# Patient Record
Sex: MALE | Race: WHITE | NOT HISPANIC OR LATINO
[De-identification: names, ages, dates, MRNs, and addresses within clinical notes are randomized per-mention and may not be internally consistent; named-entity substitution may affect disease eponyms.]

---

## 2018-02-20 ENCOUNTER — APPOINTMENT (OUTPATIENT)
Dept: OTOLARYNGOLOGY | Facility: CLINIC | Age: 73
End: 2018-02-20
Payer: COMMERCIAL

## 2018-02-20 VITALS — HEIGHT: 73 IN | SYSTOLIC BLOOD PRESSURE: 107 MMHG | DIASTOLIC BLOOD PRESSURE: 70 MMHG

## 2018-02-20 PROCEDURE — 99213 OFFICE O/P EST LOW 20 MIN: CPT | Mod: 25

## 2018-02-20 PROCEDURE — 31579 LARYNGOSCOPY TELESCOPIC: CPT

## 2018-05-22 ENCOUNTER — APPOINTMENT (OUTPATIENT)
Dept: OTOLARYNGOLOGY | Facility: CLINIC | Age: 73
End: 2018-05-22
Payer: COMMERCIAL

## 2018-05-22 VITALS — HEIGHT: 73 IN | WEIGHT: 220 LBS | BODY MASS INDEX: 29.16 KG/M2

## 2018-05-22 VITALS — DIASTOLIC BLOOD PRESSURE: 86 MMHG | SYSTOLIC BLOOD PRESSURE: 152 MMHG

## 2018-05-22 PROCEDURE — 31579 LARYNGOSCOPY TELESCOPIC: CPT

## 2018-05-22 PROCEDURE — 99214 OFFICE O/P EST MOD 30 MIN: CPT | Mod: 25

## 2018-10-29 ENCOUNTER — APPOINTMENT (OUTPATIENT)
Dept: OTOLARYNGOLOGY | Facility: CLINIC | Age: 73
End: 2018-10-29
Payer: MEDICARE

## 2018-10-29 VITALS
HEIGHT: 73 IN | WEIGHT: 230 LBS | DIASTOLIC BLOOD PRESSURE: 76 MMHG | BODY MASS INDEX: 30.48 KG/M2 | SYSTOLIC BLOOD PRESSURE: 129 MMHG

## 2018-10-29 PROCEDURE — 99214 OFFICE O/P EST MOD 30 MIN: CPT | Mod: 25

## 2018-10-29 PROCEDURE — 31576 LARYNGOSCOPY WITH BIOPSY: CPT

## 2019-04-29 ENCOUNTER — APPOINTMENT (OUTPATIENT)
Dept: OTOLARYNGOLOGY | Facility: CLINIC | Age: 74
End: 2019-04-29
Payer: MEDICARE

## 2019-04-29 VITALS
HEIGHT: 73 IN | BODY MASS INDEX: 33.13 KG/M2 | WEIGHT: 250 LBS | SYSTOLIC BLOOD PRESSURE: 145 MMHG | DIASTOLIC BLOOD PRESSURE: 79 MMHG

## 2019-04-29 PROCEDURE — 99213 OFFICE O/P EST LOW 20 MIN: CPT | Mod: 25

## 2019-04-29 PROCEDURE — 31575 DIAGNOSTIC LARYNGOSCOPY: CPT

## 2019-04-29 NOTE — PROCEDURE
[Image(s) Captured] : image(s) captured and filed [Hoarseness] : hoarseness not clearly evaluated by indirect laryngoscopy [Topical Lidocaine] : topical lidocaine [Flexible Endoscope] : examined with the flexible endoscope [Normal] : normal [de-identified] : Storz laryngoscope:    both vocal cords move well. no definite lesion....erythema from radiation

## 2019-04-29 NOTE — HISTORY OF PRESENT ILLNESS
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 Meza. Sent for RT with Dr. Hernandez - completed 2015.  + slight hoarse remains stable.  + still smoking 1PPD

## 2019-08-02 ENCOUNTER — APPOINTMENT (OUTPATIENT)
Dept: INTERNAL MEDICINE | Facility: CLINIC | Age: 74
End: 2019-08-02
Payer: MEDICARE

## 2019-08-02 VITALS
SYSTOLIC BLOOD PRESSURE: 120 MMHG | BODY MASS INDEX: 29.55 KG/M2 | DIASTOLIC BLOOD PRESSURE: 70 MMHG | HEIGHT: 73 IN | TEMPERATURE: 97.6 F | OXYGEN SATURATION: 96 % | WEIGHT: 223 LBS | HEART RATE: 76 BPM

## 2019-08-02 DIAGNOSIS — R35.0 FREQUENCY OF MICTURITION: ICD-10-CM

## 2019-08-02 LAB
BILIRUB UR QL STRIP: NEGATIVE
CLARITY UR: CLEAR
COLLECTION METHOD: NORMAL
GLUCOSE UR-MCNC: NEGATIVE
HCG UR QL: 0.2 EU/DL
HGB UR QL STRIP.AUTO: NEGATIVE
KETONES UR-MCNC: NEGATIVE
LEUKOCYTE ESTERASE UR QL STRIP: NEGATIVE
NITRITE UR QL STRIP: NEGATIVE
PH UR STRIP: 6.5
PROT UR STRIP-MCNC: NEGATIVE
SP GR UR STRIP: 1.02

## 2019-08-02 PROCEDURE — G0444 DEPRESSION SCREEN ANNUAL: CPT | Mod: 59

## 2019-08-02 PROCEDURE — 99204 OFFICE O/P NEW MOD 45 MIN: CPT | Mod: 25

## 2019-08-02 PROCEDURE — 81003 URINALYSIS AUTO W/O SCOPE: CPT | Mod: QW

## 2019-08-02 PROCEDURE — G0442 ANNUAL ALCOHOL SCREEN 15 MIN: CPT | Mod: 59

## 2019-08-02 NOTE — PHYSICAL EXAM
[No Acute Distress] : no acute distress [Well Nourished] : well nourished [Well Developed] : well developed [Well-Appearing] : well-appearing [PERRL] : pupils equal round and reactive to light [Normal Sclera/Conjunctiva] : normal sclera/conjunctiva [EOMI] : extraocular movements intact [Normal Outer Ear/Nose] : the outer ears and nose were normal in appearance [Normal Oropharynx] : the oropharynx was normal [No JVD] : no jugular venous distention [No Lymphadenopathy] : no lymphadenopathy [Supple] : supple [Thyroid Normal, No Nodules] : the thyroid was normal and there were no nodules present [No Accessory Muscle Use] : no accessory muscle use [No Respiratory Distress] : no respiratory distress  [Clear to Auscultation] : lungs were clear to auscultation bilaterally [Normal Rate] : normal rate  [Regular Rhythm] : with a regular rhythm [Normal S1, S2] : normal S1 and S2 [No Murmur] : no murmur heard [No Carotid Bruits] : no carotid bruits [No Abdominal Bruit] : a ~M bruit was not heard ~T in the abdomen [No Varicosities] : no varicosities [Pedal Pulses Present] : the pedal pulses are present [No Edema] : there was no peripheral edema [No Palpable Aorta] : no palpable aorta [No Extremity Clubbing/Cyanosis] : no extremity clubbing/cyanosis [Non Tender] : non-tender [Soft] : abdomen soft [Non-distended] : non-distended [No Masses] : no abdominal mass palpated [Normal Bowel Sounds] : normal bowel sounds [No HSM] : no HSM [Normal Posterior Cervical Nodes] : no posterior cervical lymphadenopathy [Normal Anterior Cervical Nodes] : no anterior cervical lymphadenopathy [No CVA Tenderness] : no CVA  tenderness [No Spinal Tenderness] : no spinal tenderness [No Joint Swelling] : no joint swelling [No Rash] : no rash [Grossly Normal Strength/Tone] : grossly normal strength/tone [Coordination Grossly Intact] : coordination grossly intact [No Focal Deficits] : no focal deficits [Normal Gait] : normal gait [Deep Tendon Reflexes (DTR)] : deep tendon reflexes were 2+ and symmetric [Normal Affect] : the affect was normal [Normal Insight/Judgement] : insight and judgment were intact

## 2019-08-04 ENCOUNTER — RX RENEWAL (OUTPATIENT)
Age: 74
End: 2019-08-04

## 2019-08-04 NOTE — HEALTH RISK ASSESSMENT
[] : Yes [26-29] : 26-78 [Yes] : Yes [2 - 4 times a month (2 pts)] : 2-4 times a month (2 points) [1 or 2 (0 pts)] : 1 or 2 (0 points) [Never (0 pts)] : Never (0 points) [No] : In the past 12 months have you used drugs other than those required for medical reasons? No [No falls in past year] : Patient reported no falls in the past year [0] : 2) Feeling down, depressed, or hopeless: Not at all (0) [Audit-CScore] : 3 [de-identified] : walks, ride bicycle

## 2019-08-04 NOTE — HISTORY OF PRESENT ILLNESS
[FreeTextEntry8] : Pt finished treatment for rectal abscess last week, and  Has c/o "burning" sensation in lower abdomen times 2.5 weeks.. denies heartburn, normal bowels\par  also urinary frequency; difficulty with stream, incomplete emptying, \par hx of bph \par \par

## 2019-08-05 ENCOUNTER — RX RENEWAL (OUTPATIENT)
Age: 74
End: 2019-08-05

## 2019-08-05 LAB
25(OH)D3 SERPL-MCNC: 27 NG/ML
ALBUMIN SERPL ELPH-MCNC: 4.4 G/DL
ALP BLD-CCNC: 58 U/L
ALT SERPL-CCNC: 24 U/L
AMYLASE/CREAT SERPL: 79 U/L
ANION GAP SERPL CALC-SCNC: 13 MMOL/L
AST SERPL-CCNC: 21 U/L
BACTERIA UR CULT: NORMAL
BASOPHILS # BLD AUTO: 0.02 K/UL
BASOPHILS NFR BLD AUTO: 0.2 %
BILIRUB SERPL-MCNC: 0.3 MG/DL
BUN SERPL-MCNC: 18 MG/DL
CALCIUM SERPL-MCNC: 9.1 MG/DL
CHLORIDE SERPL-SCNC: 107 MMOL/L
CHOLEST SERPL-MCNC: 171 MG/DL
CHOLEST/HDLC SERPL: 6.1 RATIO
CO2 SERPL-SCNC: 25 MMOL/L
CREAT SERPL-MCNC: 1.3 MG/DL
EOSINOPHIL # BLD AUTO: 0.38 K/UL
EOSINOPHIL NFR BLD AUTO: 3.5 %
GLUCOSE SERPL-MCNC: 134 MG/DL
HCT VFR BLD CALC: 49.4 %
HDLC SERPL-MCNC: 28 MG/DL
HGB BLD-MCNC: 15.9 G/DL
IMM GRANULOCYTES NFR BLD AUTO: 0.4 %
LDLC SERPL CALC-MCNC: 101 MG/DL
LPL SERPL-CCNC: 72 U/L
LYMPHOCYTES # BLD AUTO: 2.58 K/UL
LYMPHOCYTES NFR BLD AUTO: 23.8 %
MAN DIFF?: NORMAL
MCHC RBC-ENTMCNC: 32.2 GM/DL
MCHC RBC-ENTMCNC: 32.4 PG
MCV RBC AUTO: 100.8 FL
MONOCYTES # BLD AUTO: 0.79 K/UL
MONOCYTES NFR BLD AUTO: 7.3 %
NEUTROPHILS # BLD AUTO: 7.01 K/UL
NEUTROPHILS NFR BLD AUTO: 64.8 %
PLATELET # BLD AUTO: 203 K/UL
POTASSIUM SERPL-SCNC: 4.9 MMOL/L
PROT SERPL-MCNC: 7.1 G/DL
PSA SERPL-MCNC: 0.83 NG/ML
RBC # BLD: 4.9 M/UL
RBC # FLD: 13.7 %
SODIUM SERPL-SCNC: 145 MMOL/L
TRIGL SERPL-MCNC: 208 MG/DL
TSH SERPL-ACNC: 0.69 UIU/ML
VIT B12 SERPL-MCNC: 495 PG/ML
WBC # FLD AUTO: 10.82 K/UL

## 2019-08-06 ENCOUNTER — MOBILE ON CALL (OUTPATIENT)
Age: 74
End: 2019-08-06

## 2019-08-07 ENCOUNTER — RX RENEWAL (OUTPATIENT)
Age: 74
End: 2019-08-07

## 2019-12-05 ENCOUNTER — RX RENEWAL (OUTPATIENT)
Age: 74
End: 2019-12-05

## 2019-12-05 DIAGNOSIS — J45.909 UNSPECIFIED ASTHMA, UNCOMPLICATED: ICD-10-CM

## 2020-05-13 VITALS — HEIGHT: 73 IN | WEIGHT: 250 LBS | BODY MASS INDEX: 33.13 KG/M2

## 2020-05-13 NOTE — ASSESSMENT
[Discussed Risks and Advised to Quit Smoking] : Discussed risks and advised to quit smoking [Declined] : Patient has declined cessation intervention

## 2020-05-13 NOTE — PLAN
[Smoking Cessation Guidance Provided] : Smoking cessation guidance was provided to patient [Age appropriate screenings] : Age appropriate screenings [Regular Exercise] : regular exercise [Smoking Cessation] : smoking cessation [FreeTextEntry1] : His LDCT is scheduled for 5/14/20 at the [facility].  [Regular follow-up with healthcare provider] : regular follow-up with healthcare provider

## 2020-05-13 NOTE — REASON FOR VISIT
[Initial Evaluation] : an initial evaluation visit [Virtual Visit] : virtual visit [Low-Dose CT Screening Discussion] : low-dose CT lung cancer screening discussion [Review of Eligibility] : review of eligibility

## 2020-05-13 NOTE — HISTORY OF PRESENT ILLNESS
[_____ pack-years] : [unfilled] pack-years [Current] : current smoker [TextBox_13] : He denies hemoptysis, denies new cough, denies unexplained weight loss.\par He is a current smoker with a 54 pack year smoking history (1PPD x 54 years).  He denies family h/o lung cancer.  He is referred by Dr. Kathrin Barnes.

## 2020-05-20 ENCOUNTER — APPOINTMENT (OUTPATIENT)
Dept: CT IMAGING | Facility: CLINIC | Age: 75
End: 2020-05-20
Payer: COMMERCIAL

## 2020-05-20 ENCOUNTER — OUTPATIENT (OUTPATIENT)
Dept: OUTPATIENT SERVICES | Facility: HOSPITAL | Age: 75
LOS: 1 days | End: 2020-05-20
Payer: COMMERCIAL

## 2020-05-20 ENCOUNTER — RESULT REVIEW (OUTPATIENT)
Age: 75
End: 2020-05-20

## 2020-05-20 DIAGNOSIS — J38.7 OTHER DISEASES OF LARYNX: Chronic | ICD-10-CM

## 2020-05-20 DIAGNOSIS — Z98.89 OTHER SPECIFIED POSTPROCEDURAL STATES: Chronic | ICD-10-CM

## 2020-05-20 DIAGNOSIS — Z00.8 ENCOUNTER FOR OTHER GENERAL EXAMINATION: ICD-10-CM

## 2020-05-20 DIAGNOSIS — E04.1 NONTOXIC SINGLE THYROID NODULE: Chronic | ICD-10-CM

## 2020-05-20 PROCEDURE — G0297: CPT

## 2020-05-20 PROCEDURE — G0297: CPT | Mod: 26

## 2020-10-14 DIAGNOSIS — M54.2 CERVICALGIA: ICD-10-CM

## 2020-11-24 ENCOUNTER — APPOINTMENT (OUTPATIENT)
Dept: FAMILY MEDICINE | Facility: CLINIC | Age: 75
End: 2020-11-24
Payer: MEDICARE

## 2020-11-24 VITALS
WEIGHT: 260 LBS | DIASTOLIC BLOOD PRESSURE: 60 MMHG | SYSTOLIC BLOOD PRESSURE: 106 MMHG | HEART RATE: 75 BPM | RESPIRATION RATE: 20 BRPM | OXYGEN SATURATION: 95 % | BODY MASS INDEX: 34.3 KG/M2

## 2020-11-24 DIAGNOSIS — E07.9 DISORDER OF THYROID, UNSPECIFIED: ICD-10-CM

## 2020-11-24 DIAGNOSIS — H61.21 IMPACTED CERUMEN, RIGHT EAR: ICD-10-CM

## 2020-11-24 DIAGNOSIS — H60.93 UNSPECIFIED OTITIS EXTERNA, BILATERAL: ICD-10-CM

## 2020-11-24 PROCEDURE — 99406 BEHAV CHNG SMOKING 3-10 MIN: CPT | Mod: 25

## 2020-11-24 PROCEDURE — 99214 OFFICE O/P EST MOD 30 MIN: CPT | Mod: 25

## 2020-11-24 PROCEDURE — 69210 REMOVE IMPACTED EAR WAX UNI: CPT

## 2020-11-24 NOTE — COUNSELING
[Risk of tobacco use and health benefits of smoking cessation discussed] : Risk of tobacco use and health benefits of smoking cessation discussed [Cessation strategies including cessation program discussed] : Cessation strategies including cessation program discussed [Use of nicotine replacement therapies and other medications discussed] : Use of nicotine replacement therapies and other medications discussed [Encouraged to pick a quit date and identify support needed to quit] : Encouraged to pick a quit date and identify support needed to quit [Willing to Quit Smoking] : Willing to quit smoking [Participate in Class] : Participate in class [Tobacco Use Cessation Intermediate Greater Than 3 Minutes Up to 10 Minutes] : Tobacco Use Cessation Intermediate Greater Than 3 Minutes Up to 10 Minutes [FreeTextEntry1] : 10

## 2020-11-24 NOTE — PHYSICAL EXAM
[Normal] : affect was normal and insight and judgment were intact [de-identified] : red pharynx without exudate. right ear quentin with wax. left ear canal swollen, visualized TM with prominent landmarks.  [de-identified] : left cervical lymph node slightly enlarged, non-tender

## 2020-11-24 NOTE — HISTORY OF PRESENT ILLNESS
[FreeTextEntry8] : Pt presents with decreased hearing, and feeling "clogged.feeling". Pt also has "a gland swollen". Patient has history of laryngeal cancer for which he received radiation. Has not had f/u with ENT in a year and a half.  smoking 1 ppd, greater than 50 pack years

## 2020-11-24 NOTE — REVIEW OF SYSTEMS
[Earache] : earache [Sore Throat] : sore throat [Hoarseness] : hoarseness [Anxiety] : anxiety [Swollen Glands] : swollen glands [Negative] : Psychiatric [Hearing Loss] : no hearing loss [Nosebleeds] : no nosebleeds [Postnasal Drip] : no postnasal drip [Nasal Discharge] : no nasal discharge [Suicidal] : not suicidal [Insomnia] : no insomnia [Depression] : no depression [FreeTextEntry4] : minor s/t, chronic [de-identified] : has caretaker stress b/c he is caring for his disabled wife, and raising his granddaughter

## 2020-12-08 ENCOUNTER — APPOINTMENT (OUTPATIENT)
Dept: OTOLARYNGOLOGY | Facility: CLINIC | Age: 75
End: 2020-12-08
Payer: MEDICARE

## 2020-12-08 ENCOUNTER — TRANSCRIPTION ENCOUNTER (OUTPATIENT)
Age: 75
End: 2020-12-08

## 2020-12-08 VITALS
SYSTOLIC BLOOD PRESSURE: 132 MMHG | BODY MASS INDEX: 34.46 KG/M2 | WEIGHT: 260 LBS | HEIGHT: 73 IN | DIASTOLIC BLOOD PRESSURE: 69 MMHG

## 2020-12-08 DIAGNOSIS — C32.9 MALIGNANT NEOPLASM OF LARYNX, UNSPECIFIED: ICD-10-CM

## 2020-12-08 DIAGNOSIS — R49.0 DYSPHONIA: ICD-10-CM

## 2020-12-08 PROCEDURE — 99214 OFFICE O/P EST MOD 30 MIN: CPT | Mod: 25

## 2020-12-08 PROCEDURE — 31576 LARYNGOSCOPY WITH BIOPSY: CPT

## 2020-12-08 RX ORDER — FINASTERIDE 5 MG/1
5 TABLET, FILM COATED ORAL DAILY
Qty: 90 | Refills: 3 | Status: COMPLETED | COMMUNITY
End: 2020-12-08

## 2020-12-08 RX ORDER — ZALEPLON 5 MG/1
5 CAPSULE ORAL
Qty: 30 | Refills: 3 | Status: COMPLETED | COMMUNITY
Start: 2020-04-06 | End: 2020-12-08

## 2020-12-08 RX ORDER — PANTOPRAZOLE 40 MG/1
40 TABLET, DELAYED RELEASE ORAL DAILY
Qty: 90 | Refills: 3 | Status: COMPLETED | COMMUNITY
Start: 2019-08-02 | End: 2020-12-08

## 2020-12-08 RX ORDER — DICLOFENAC SODIUM 50 MG/1
50 TABLET, DELAYED RELEASE ORAL 3 TIMES DAILY
Qty: 60 | Refills: 0 | Status: COMPLETED | COMMUNITY
Start: 2020-08-31 | End: 2020-12-08

## 2020-12-08 RX ORDER — TAMSULOSIN HYDROCHLORIDE 0.4 MG/1
0.4 CAPSULE ORAL
Qty: 90 | Refills: 0 | Status: COMPLETED | COMMUNITY
End: 2020-12-08

## 2020-12-08 RX ORDER — AZITHROMYCIN 250 MG/1
250 TABLET, FILM COATED ORAL
Qty: 6 | Refills: 0 | Status: COMPLETED | COMMUNITY
Start: 2019-12-05 | End: 2020-12-08

## 2020-12-08 RX ORDER — NEOMYCIN SULFATE, POLYMYXIN B SULFATE, HYDROCORTISONE 3.5; 10000; 1 MG/ML; [USP'U]/ML; MG/ML
1 SOLUTION/ DROPS AURICULAR (OTIC) 4 TIMES DAILY
Qty: 1 | Refills: 0 | Status: COMPLETED | COMMUNITY
Start: 2020-11-24 | End: 2020-12-08

## 2020-12-08 NOTE — REVIEW OF SYSTEMS
[As Noted in HPI] : as noted in HPI [Patient Intake Form Reviewed] : Patient intake form was reviewed [Negative] : Head and Neck

## 2020-12-08 NOTE — PROCEDURE
[Image(s) Captured] : image(s) captured and filed [Video Captured] : video captured and filed [Topical Lidocaine] : topical lidocaine [Oxymetazoline HCl] : oxymetazoline HCl [Rigid Endoscope] : examined with a rigid endoscope [Serial Number: ___] : Serial Number: [unfilled] [Hoarseness] : hoarseness not clearly evaluated by indirect laryngoscopy [Normal] : normal [True Vocal Cords Paralysis] : no true vocal cord paralysis [True Vocal Cords Erythematous] : no true vocal cord edema [Glottis Arytenoid Cartilages] : no arytenoid granulomas [Glottis Arytenoid Cartilages Erythema] : no arytenoid erythema [de-identified] : Flexible right nasolaryngoscopy:  Both vocal cords move well.  No lesions of larynx.

## 2020-12-08 NOTE — HISTORY OF PRESENT ILLNESS
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 Meza. Sent for RT with Dr. Hernandez - completed 2015. Voice remains stable. + still smoking 1PPD \par \par Reports bilateral clogged ears. States was told by PCP ear canal is narrow 3 weeks ago and to follow up with ENT. Denies otalgia, otorrhea, ear infections, hearing loss, tinnitus, dizziness, vertigo, headaches related to hearing

## 2020-12-15 DIAGNOSIS — Z20.828 CONTACT WITH AND (SUSPECTED) EXPOSURE TO OTHER VIRAL COMMUNICABLE DISEASES: ICD-10-CM

## 2020-12-18 LAB — SARS-COV-2 N GENE NPH QL NAA+PROBE: NOT DETECTED

## 2021-01-12 ENCOUNTER — NON-APPOINTMENT (OUTPATIENT)
Age: 76
End: 2021-01-12

## 2021-01-25 DIAGNOSIS — Z20.822 CONTACT WITH AND (SUSPECTED) EXPOSURE TO COVID-19: ICD-10-CM

## 2021-03-24 DIAGNOSIS — M26.609 UNSPECIFIED TEMPOROMANDIBULAR JOINT DISORDER: ICD-10-CM

## 2021-04-13 ENCOUNTER — APPOINTMENT (OUTPATIENT)
Dept: FAMILY MEDICINE | Facility: CLINIC | Age: 76
End: 2021-04-13
Payer: MEDICARE

## 2021-04-13 ENCOUNTER — RESULT REVIEW (OUTPATIENT)
Age: 76
End: 2021-04-13

## 2021-04-13 VITALS
HEART RATE: 84 BPM | OXYGEN SATURATION: 97 % | DIASTOLIC BLOOD PRESSURE: 78 MMHG | RESPIRATION RATE: 20 BRPM | SYSTOLIC BLOOD PRESSURE: 128 MMHG

## 2021-04-13 DIAGNOSIS — Z00.00 ENCOUNTER FOR GENERAL ADULT MEDICAL EXAMINATION W/OUT ABNORMAL FINDINGS: ICD-10-CM

## 2021-04-13 PROCEDURE — 99214 OFFICE O/P EST MOD 30 MIN: CPT | Mod: 25

## 2021-04-13 PROCEDURE — 36415 COLL VENOUS BLD VENIPUNCTURE: CPT

## 2021-04-13 NOTE — HISTORY OF PRESENT ILLNESS
[FreeTextEntry8] : Patient with history of diverticulitis, started with left sided pain on 3/23/2021, on a trip to [place]. Pain was mild until the last two nights. no nausea, vomiting, normal BMs, no fever. \par describes a "burning", and most apparent over left rib cage.

## 2021-04-13 NOTE — PLAN
[FreeTextEntry1] : \par Will order blood work and CT scan to r/o diverticulitis - f/u with results\par Patient will be started on abx and will f/u \par Patient verbalized understanding

## 2021-04-13 NOTE — PHYSICAL EXAM
[Soft] : abdomen soft [Non-distended] : non-distended [No Masses] : no abdominal mass palpated [No HSM] : no HSM [Normal Bowel Sounds] : normal bowel sounds [Normal] : normal rate, regular rhythm, normal S1 and S2 and no murmur heard [de-identified] : tender to LUQ

## 2021-04-14 ENCOUNTER — APPOINTMENT (OUTPATIENT)
Dept: CT IMAGING | Facility: CLINIC | Age: 76
End: 2021-04-14
Payer: MEDICARE

## 2021-04-14 ENCOUNTER — OUTPATIENT (OUTPATIENT)
Dept: OUTPATIENT SERVICES | Facility: HOSPITAL | Age: 76
LOS: 1 days | End: 2021-04-14
Payer: MEDICARE

## 2021-04-14 DIAGNOSIS — Z98.89 OTHER SPECIFIED POSTPROCEDURAL STATES: Chronic | ICD-10-CM

## 2021-04-14 DIAGNOSIS — R10.9 UNSPECIFIED ABDOMINAL PAIN: ICD-10-CM

## 2021-04-14 DIAGNOSIS — J38.7 OTHER DISEASES OF LARYNX: Chronic | ICD-10-CM

## 2021-04-14 DIAGNOSIS — E04.1 NONTOXIC SINGLE THYROID NODULE: Chronic | ICD-10-CM

## 2021-04-14 LAB
25(OH)D3 SERPL-MCNC: 21.6 NG/ML
ALBUMIN SERPL ELPH-MCNC: 4.4 G/DL
ALP BLD-CCNC: 64 U/L
ALT SERPL-CCNC: 10 U/L
AMYLASE/CREAT SERPL: 91 U/L
ANION GAP SERPL CALC-SCNC: 10 MMOL/L
AST SERPL-CCNC: 18 U/L
BASOPHILS # BLD AUTO: 0.1 K/UL
BASOPHILS NFR BLD AUTO: 1.1 %
BILIRUB SERPL-MCNC: 0.2 MG/DL
BUN SERPL-MCNC: 20 MG/DL
CALCIUM SERPL-MCNC: 9.1 MG/DL
CHLORIDE SERPL-SCNC: 106 MMOL/L
CHOLEST SERPL-MCNC: 294 MG/DL
CO2 SERPL-SCNC: 23 MMOL/L
CREAT SERPL-MCNC: 1.36 MG/DL
EOSINOPHIL # BLD AUTO: 0.37 K/UL
EOSINOPHIL NFR BLD AUTO: 3.9 %
ESTIMATED AVERAGE GLUCOSE: 137 MG/DL
GLUCOSE SERPL-MCNC: 80 MG/DL
HBA1C MFR BLD HPLC: 6.4 %
HCT VFR BLD CALC: 49.3 %
HDLC SERPL-MCNC: 28 MG/DL
HGB BLD-MCNC: 16.1 G/DL
IMM GRANULOCYTES NFR BLD AUTO: 0.2 %
LDLC SERPL CALC-MCNC: 214 MG/DL
LPL SERPL-CCNC: 83 U/L
LYMPHOCYTES # BLD AUTO: 3.03 K/UL
LYMPHOCYTES NFR BLD AUTO: 31.9 %
MAN DIFF?: NORMAL
MCHC RBC-ENTMCNC: 32.7 GM/DL
MCHC RBC-ENTMCNC: 32.8 PG
MCV RBC AUTO: 100.4 FL
MONOCYTES # BLD AUTO: 0.77 K/UL
MONOCYTES NFR BLD AUTO: 8.1 %
NEUTROPHILS # BLD AUTO: 5.21 K/UL
NEUTROPHILS NFR BLD AUTO: 54.8 %
NONHDLC SERPL-MCNC: 266 MG/DL
PLATELET # BLD AUTO: 230 K/UL
POTASSIUM SERPL-SCNC: 4.2 MMOL/L
PROT SERPL-MCNC: 6.9 G/DL
RBC # BLD: 4.91 M/UL
RBC # FLD: 14.2 %
SODIUM SERPL-SCNC: 140 MMOL/L
TRIGL SERPL-MCNC: 259 MG/DL
TSH SERPL-ACNC: 0.82 UIU/ML
VIT B12 SERPL-MCNC: 371 PG/ML
WBC # FLD AUTO: 9.5 K/UL

## 2021-04-14 PROCEDURE — 74177 CT ABD & PELVIS W/CONTRAST: CPT

## 2021-04-14 PROCEDURE — G1004: CPT

## 2021-04-14 PROCEDURE — 74177 CT ABD & PELVIS W/CONTRAST: CPT | Mod: 26,ME

## 2021-05-06 ENCOUNTER — APPOINTMENT (OUTPATIENT)
Dept: GASTROENTEROLOGY | Facility: CLINIC | Age: 76
End: 2021-05-06
Payer: MEDICARE

## 2021-05-06 VITALS
HEART RATE: 76 BPM | DIASTOLIC BLOOD PRESSURE: 76 MMHG | WEIGHT: 208.25 LBS | BODY MASS INDEX: 27.6 KG/M2 | SYSTOLIC BLOOD PRESSURE: 126 MMHG | HEIGHT: 73 IN | OXYGEN SATURATION: 97 % | TEMPERATURE: 97.5 F

## 2021-05-06 DIAGNOSIS — R10.9 UNSPECIFIED ABDOMINAL PAIN: ICD-10-CM

## 2021-05-06 DIAGNOSIS — Z87.19 PERSONAL HISTORY OF OTHER DISEASES OF THE DIGESTIVE SYSTEM: ICD-10-CM

## 2021-05-06 PROCEDURE — 99202 OFFICE O/P NEW SF 15 MIN: CPT

## 2021-05-06 RX ORDER — L-METHYLFOLATE-ALGAE-VIT B12-B6 CAP 3-90.314-2-35 MG 3-90.314-2-35 MG
3-90.314-2-35 CAP ORAL
Qty: 180 | Refills: 0 | Status: DISCONTINUED | COMMUNITY
Start: 2021-01-07 | End: 2021-05-06

## 2021-05-06 RX ORDER — L-METHYLFOLATE-ALGAE-VIT B12-B6 CAP 3-90.314-2-35 MG 3-90.314-2-35 MG
3-90.314-2-35 CAP ORAL
Qty: 180 | Refills: 0 | Status: DISCONTINUED | COMMUNITY
Start: 2021-01-05 | End: 2021-05-06

## 2021-05-06 RX ORDER — CARISOPRODOL 250 MG/1
250 TABLET ORAL 4 TIMES DAILY
Qty: 30 | Refills: 3 | Status: DISCONTINUED | COMMUNITY
Start: 2021-03-24 | End: 2021-05-06

## 2021-05-06 RX ORDER — CIPROFLOXACIN HYDROCHLORIDE 500 MG/1
500 TABLET, FILM COATED ORAL TWICE DAILY
Qty: 20 | Refills: 0 | Status: DISCONTINUED | COMMUNITY
Start: 2021-04-13 | End: 2021-05-06

## 2021-05-06 NOTE — PHYSICAL EXAM
[General Appearance - Alert] : alert [General Appearance - In No Acute Distress] : in no acute distress [Neck Appearance] : the appearance of the neck was normal [Neck Cervical Mass (___cm)] : no neck mass was observed [Jugular Venous Distention Increased] : there was no jugular-venous distention [Thyroid Diffuse Enlargement] : the thyroid was not enlarged [Thyroid Nodule] : there were no palpable thyroid nodules [Auscultation Breath Sounds / Voice Sounds] : lungs were clear to auscultation bilaterally [Heart Rate And Rhythm] : heart rate was normal and rhythm regular [Heart Sounds] : normal S1 and S2 [Heart Sounds Gallop] : no gallops [Murmurs] : no murmurs [Heart Sounds Pericardial Friction Rub] : no pericardial rub [Full Pulse] : the pedal pulses are present [Edema] : there was no peripheral edema [Bowel Sounds] : normal bowel sounds [Abdomen Soft] : soft [Abdomen Tenderness] : non-tender [Abdomen Mass (___ Cm)] : no abdominal mass palpated [] : no hepato-splenomegaly [Cervical Lymph Nodes Enlarged Posterior Bilaterally] : posterior cervical [Cervical Lymph Nodes Enlarged Anterior Bilaterally] : anterior cervical [Supraclavicular Lymph Nodes Enlarged Bilaterally] : supraclavicular [Axillary Lymph Nodes Enlarged Bilaterally] : axillary [Femoral Lymph Nodes Enlarged Bilaterally] : femoral [Inguinal Lymph Nodes Enlarged Bilaterally] : inguinal [No CVA Tenderness] : no ~M costovertebral angle tenderness [No Spinal Tenderness] : no spinal tenderness [FreeTextEntry1] : There is tenderness/hypersensitivity of skin above left lower rib cage.  No tenderness to deep palpation.  No chest wall tenderness

## 2021-05-06 NOTE — ASSESSMENT
[FreeTextEntry1] : Symptoms consistent with superficial nerve inflammation no abdominal or chest wall tenderness.  Possible neuropathic/radicular pain.  Slightly elevated lipase is of no significance given normal amylase and normal CT.  No symptoms or signs consistent with GI pathology.\par \par Patient appears to be at average risk for colorectal cancer and would not need another colonoscopy until 10 years after his previous one.  No further GI work-up would be indicated at this time.  Suggest supportive treatment for nerve pain e.g. NSAIDs, neuromodulators, etc.

## 2021-05-06 NOTE — CONSULT LETTER
Problem: Pressure Injury, Risk for  Goal: # Skin remains intact  Outcome: Outcome Met, Continue evaluating goal progress toward completion  Barrier cream applied to bilateral heels and buttocks. Pt is being turned every two hours.     Problem: VTE, Risk for  Goal: # No s/s of VTE  Outcome: Outcome Met, Continue evaluating goal progress toward completion  No signs or symptoms of DVT upon assessment    Problem: At Risk for Falls  Goal: # Patient does not fall  Outcome: Outcome Met, Continue evaluating goal progress toward completion  No impulsive behaviors.        [Dear  ___] : Dear  [unfilled], [Consult Letter:] : I had the pleasure of evaluating your patient, [unfilled]. [Please see my note below.] : Please see my note below. [Sincerely,] : Sincerely, [FreeTextEntry2] : TRANG MAE [FreeTextEntry3] : Fernie Hebert MD

## 2021-05-09 ENCOUNTER — APPOINTMENT (OUTPATIENT)
Dept: FAMILY MEDICINE | Facility: CLINIC | Age: 76
End: 2021-05-09

## 2021-08-06 ENCOUNTER — APPOINTMENT (OUTPATIENT)
Dept: FAMILY MEDICINE | Facility: CLINIC | Age: 76
End: 2021-08-06

## 2021-09-24 DIAGNOSIS — M12.562 TRAUMATIC ARTHROPATHY, LEFT KNEE: ICD-10-CM

## 2021-12-05 ENCOUNTER — TRANSCRIPTION ENCOUNTER (OUTPATIENT)
Age: 76
End: 2021-12-05

## 2022-01-04 LAB
25(OH)D3 SERPL-MCNC: 24.6 NG/ML
ALBUMIN SERPL ELPH-MCNC: 4.4 G/DL
ALP BLD-CCNC: 65 U/L
ALT SERPL-CCNC: 18 U/L
AMYLASE/CREAT SERPL: 80 U/L
ANION GAP SERPL CALC-SCNC: 13 MMOL/L
AST SERPL-CCNC: 24 U/L
BASOPHILS # BLD AUTO: 0.1 K/UL
BASOPHILS NFR BLD AUTO: 1 %
BILIRUB SERPL-MCNC: 0.5 MG/DL
BUN SERPL-MCNC: 22 MG/DL
CALCIUM SERPL-MCNC: 9.3 MG/DL
CHLORIDE SERPL-SCNC: 107 MMOL/L
CHOLEST SERPL-MCNC: 307 MG/DL
CO2 SERPL-SCNC: 23 MMOL/L
CREAT SERPL-MCNC: 1.55 MG/DL
EOSINOPHIL # BLD AUTO: 0.42 K/UL
EOSINOPHIL NFR BLD AUTO: 4.3 %
ESTIMATED AVERAGE GLUCOSE: 137 MG/DL
FOLATE SERPL-MCNC: 12 NG/ML
GLUCOSE SERPL-MCNC: 105 MG/DL
HBA1C MFR BLD HPLC: 6.4 %
HCT VFR BLD CALC: 50 %
HDLC SERPL-MCNC: 31 MG/DL
HGB BLD-MCNC: 16.5 G/DL
IMM GRANULOCYTES NFR BLD AUTO: 0.5 %
LDLC SERPL CALC-MCNC: 222 MG/DL
LPL SERPL-CCNC: 75 U/L
LYMPHOCYTES # BLD AUTO: 2.74 K/UL
LYMPHOCYTES NFR BLD AUTO: 28.3 %
MAN DIFF?: NORMAL
MCHC RBC-ENTMCNC: 32.7 PG
MCHC RBC-ENTMCNC: 33 GM/DL
MCV RBC AUTO: 99 FL
MONOCYTES # BLD AUTO: 0.82 K/UL
MONOCYTES NFR BLD AUTO: 8.5 %
NEUTROPHILS # BLD AUTO: 5.55 K/UL
NEUTROPHILS NFR BLD AUTO: 57.4 %
NONHDLC SERPL-MCNC: 276 MG/DL
PLATELET # BLD AUTO: 215 K/UL
POTASSIUM SERPL-SCNC: 4.7 MMOL/L
PROT SERPL-MCNC: 7.1 G/DL
PSA SERPL-MCNC: 3.44 NG/ML
RBC # BLD: 5.05 M/UL
RBC # FLD: 14.1 %
SODIUM SERPL-SCNC: 143 MMOL/L
TRIGL SERPL-MCNC: 272 MG/DL
TSH SERPL-ACNC: 1.05 UIU/ML
VIT B12 SERPL-MCNC: 351 PG/ML
WBC # FLD AUTO: 9.68 K/UL

## 2022-02-15 ENCOUNTER — TRANSCRIPTION ENCOUNTER (OUTPATIENT)
Age: 77
End: 2022-02-15

## 2022-04-26 ENCOUNTER — APPOINTMENT (OUTPATIENT)
Dept: ORTHOPEDIC SURGERY | Facility: CLINIC | Age: 77
End: 2022-04-26
Payer: MEDICARE

## 2022-04-26 VITALS — WEIGHT: 250 LBS | HEIGHT: 73 IN | BODY MASS INDEX: 33.13 KG/M2

## 2022-04-26 DIAGNOSIS — S83.92XA SPRAIN OF UNSPECIFIED SITE OF LEFT KNEE, INITIAL ENCOUNTER: ICD-10-CM

## 2022-04-26 DIAGNOSIS — S83.8X2D SPRAIN OF OTHER SPECIFIED PARTS OF LEFT KNEE, SUBSEQUENT ENCOUNTER: ICD-10-CM

## 2022-04-26 PROCEDURE — 99213 OFFICE O/P EST LOW 20 MIN: CPT

## 2022-04-26 NOTE — ASSESSMENT
[FreeTextEntry1] : left knee pain with acute onset pain after fall off scooter on 9/23/21. mri 2021 show distal PCL avulsion, mmt, oa. fairly large mmt. improved with PT.  mild pain.

## 2022-04-26 NOTE — PHYSICAL EXAM
[Left] : left knee [] : anterior tenderness [NL (0)] : extension 0 degrees [4___] : quadriceps 4[unfilled]/5 [5___] : hamstring 5[unfilled]/5 [TWNoteComboBox7] : flexion 120 degrees

## 2022-04-26 NOTE — HISTORY OF PRESENT ILLNESS
[2] : 2 [Constant] : constant [de-identified] : 4/26/22: left knee pain improved but still present. [FreeTextEntry1] : left knee [de-identified] : physical therapy

## 2022-09-23 DIAGNOSIS — Z23 ENCOUNTER FOR IMMUNIZATION: ICD-10-CM

## 2022-10-18 ENCOUNTER — APPOINTMENT (OUTPATIENT)
Dept: ORTHOPEDIC SURGERY | Facility: CLINIC | Age: 77
End: 2022-10-18

## 2022-10-18 VITALS — WEIGHT: 250 LBS | BODY MASS INDEX: 33.13 KG/M2 | HEIGHT: 73 IN

## 2022-10-18 DIAGNOSIS — M54.50 LOW BACK PAIN, UNSPECIFIED: ICD-10-CM

## 2022-10-18 DIAGNOSIS — M79.18 MYALGIA, OTHER SITE: ICD-10-CM

## 2022-10-18 PROCEDURE — 99213 OFFICE O/P EST LOW 20 MIN: CPT

## 2022-10-18 PROCEDURE — 72100 X-RAY EXAM L-S SPINE 2/3 VWS: CPT

## 2022-10-18 PROCEDURE — 73502 X-RAY EXAM HIP UNI 2-3 VIEWS: CPT

## 2022-10-18 NOTE — DISCUSSION/SUMMARY
[de-identified] : Instructions:  Progress Note completed by Denise Orellana PA-C\par * Dr. Wu -- The documentation recorded accurately reflects the decisions made by me during this visit.

## 2022-10-18 NOTE — PHYSICAL EXAM
[4___] : abduction 4[unfilled]/5 [5___] : adduction 5[unfilled]/5 [2+] : posterior tibialis pulse: 2+ [Disc space narrowing] : Disc space narrowing [FreeTextEntry8] : ttp buttock [de-identified] : no fx.  oa present bilateral hips. [] : no scars [Bilateral] : hip bilaterally [Moderate arthritis (Tonnis Grade 2)] : Moderate arthritis (Tonnis Grade 2)

## 2022-10-18 NOTE — HISTORY OF PRESENT ILLNESS
[9] : 9 [1] : 2 [Dull/Aching] : dull/aching [Radiating] : radiating [Sharp] : sharp [Intermittent] : intermittent [Leisure] : leisure [Rest] : rest [de-identified] : 10/18/22:   bilateral buttock pain for a few months.  no injury.  pain worse with walking/standing for long periods of time.  \par  [] : Post Surgical Visit: no [FreeTextEntry1] : bilateral hips [FreeTextEntry3] : Sep 2022 [FreeTextEntry5] : No known injury [FreeTextEntry7] : buttocks [de-identified] : activity

## 2022-10-18 NOTE — ASSESSMENT
[FreeTextEntry1] : bilateral buttock pain for a few months.  no injury.  history of lumbar hnp - sees pain management for injections (recently got L4,L5 darwin early october 2022).  possible muscular in origin, possible lumbar etiology (L5,S1).  \par \par seeing dr. latham.  tpis did not help.

## 2023-02-23 ENCOUNTER — TRANSCRIPTION ENCOUNTER (OUTPATIENT)
Age: 78
End: 2023-02-23

## 2023-02-26 ENCOUNTER — TRANSCRIPTION ENCOUNTER (OUTPATIENT)
Age: 78
End: 2023-02-26

## 2023-03-03 ENCOUNTER — APPOINTMENT (OUTPATIENT)
Dept: ORTHOPEDIC SURGERY | Facility: CLINIC | Age: 78
End: 2023-03-03
Payer: MEDICARE

## 2023-03-03 ENCOUNTER — TRANSCRIPTION ENCOUNTER (OUTPATIENT)
Age: 78
End: 2023-03-03

## 2023-03-03 DIAGNOSIS — G62.9 POLYNEUROPATHY, UNSPECIFIED: ICD-10-CM

## 2023-03-03 DIAGNOSIS — M20.21 HALLUX RIGIDUS, RIGHT FOOT: ICD-10-CM

## 2023-03-03 DIAGNOSIS — R23.8 OTHER SKIN CHANGES: ICD-10-CM

## 2023-03-03 PROCEDURE — 73630 X-RAY EXAM OF FOOT: CPT | Mod: RT

## 2023-03-03 PROCEDURE — 99214 OFFICE O/P EST MOD 30 MIN: CPT

## 2023-03-03 NOTE — HISTORY OF PRESENT ILLNESS
[de-identified] : Patient is a 77 year old male presents with right great toe pain, swelling and discoloration since early February, 2023. Pain has been progressing. No treatment to date. No similar symptoms in the past. No trauma. He reports a history of neuropathy but no diabetes.

## 2023-03-03 NOTE — PHYSICAL EXAM
[NL (40)] : plantar flexion 40 degrees [NL 30)] : inversion 30 degrees [NL (20)] : eversion 20 degrees [5___] : [facility] 5[unfilled]/5 [1st] : 1st [absent] : posterior tibialis pulse: absent [Decreased] : saphenous nerve sensation decreased [Right] : right foot [Weight -] : weightbearing [] : no pain when stressing lateral tarsal metatarsal joint [FreeTextEntry3] : Clear demarkation dusky appearance of hallux from IP joint distally.  [FreeTextEntry8] : Palpable osteophytes dorsomedial great toe.  [de-identified] : Absent pulses on the LT as well.  [de-identified] : Severe 1st MTP joint degenerative changes with dorsal osteophyte formation, plantar heel spur, achilles insertion spur.  No bone changes in distal phalanx.

## 2023-03-03 NOTE — ASSESSMENT
[FreeTextEntry1] : Patient was explained that his problem in the great toe is likely vascular in nature.\par He was advised to go to the ER today for vascular evaluation as there is a fear that he could develop an infection or possible lose his toe.  He refuses to do this.\par \par He was made an appointment to see Dr. Bryant Bunn, a vascular specialist at 12:30 pm on 3/8/23.  This information was given to the patient.\par \par There is no orthopedic intervention warranted at this time.

## 2023-03-04 ENCOUNTER — INPATIENT (INPATIENT)
Facility: HOSPITAL | Age: 78
LOS: 5 days | Discharge: HOME | DRG: 254 | End: 2023-03-10
Attending: SURGERY | Admitting: INTERNAL MEDICINE
Payer: MEDICARE

## 2023-03-04 VITALS
DIASTOLIC BLOOD PRESSURE: 82 MMHG | TEMPERATURE: 98 F | HEIGHT: 73 IN | OXYGEN SATURATION: 97 % | HEART RATE: 85 BPM | SYSTOLIC BLOOD PRESSURE: 172 MMHG | WEIGHT: 250 LBS | RESPIRATION RATE: 18 BRPM

## 2023-03-04 DIAGNOSIS — Z98.89 OTHER SPECIFIED POSTPROCEDURAL STATES: Chronic | ICD-10-CM

## 2023-03-04 DIAGNOSIS — E04.1 NONTOXIC SINGLE THYROID NODULE: Chronic | ICD-10-CM

## 2023-03-04 DIAGNOSIS — I99.8 OTHER DISORDER OF CIRCULATORY SYSTEM: ICD-10-CM

## 2023-03-04 DIAGNOSIS — J38.7 OTHER DISEASES OF LARYNX: Chronic | ICD-10-CM

## 2023-03-04 LAB
ALBUMIN SERPL ELPH-MCNC: 4.1 G/DL — SIGNIFICANT CHANGE UP (ref 3.3–5)
ALP SERPL-CCNC: 61 U/L — SIGNIFICANT CHANGE UP (ref 40–120)
ALT FLD-CCNC: 20 U/L — SIGNIFICANT CHANGE UP (ref 10–45)
ANION GAP SERPL CALC-SCNC: 14 MMOL/L — SIGNIFICANT CHANGE UP (ref 5–17)
APTT BLD: 27.1 SEC — LOW (ref 27.5–35.5)
AST SERPL-CCNC: 22 U/L — SIGNIFICANT CHANGE UP (ref 10–40)
BASOPHILS # BLD AUTO: 0.11 K/UL — SIGNIFICANT CHANGE UP (ref 0–0.2)
BASOPHILS NFR BLD AUTO: 1.2 % — SIGNIFICANT CHANGE UP (ref 0–2)
BILIRUB SERPL-MCNC: 0.2 MG/DL — SIGNIFICANT CHANGE UP (ref 0.2–1.2)
BLD GP AB SCN SERPL QL: NEGATIVE — SIGNIFICANT CHANGE UP
BUN SERPL-MCNC: 26 MG/DL — HIGH (ref 7–23)
CALCIUM SERPL-MCNC: 9.3 MG/DL — SIGNIFICANT CHANGE UP (ref 8.4–10.5)
CHLORIDE SERPL-SCNC: 105 MMOL/L — SIGNIFICANT CHANGE UP (ref 96–108)
CO2 SERPL-SCNC: 21 MMOL/L — LOW (ref 22–31)
CREAT SERPL-MCNC: 1.32 MG/DL — HIGH (ref 0.5–1.3)
CRP SERPL-MCNC: 4 MG/L — SIGNIFICANT CHANGE UP (ref 0–4)
EGFR: 56 ML/MIN/1.73M2 — LOW
EOSINOPHIL # BLD AUTO: 0.45 K/UL — SIGNIFICANT CHANGE UP (ref 0–0.5)
EOSINOPHIL NFR BLD AUTO: 4.8 % — SIGNIFICANT CHANGE UP (ref 0–6)
ERYTHROCYTE [SEDIMENTATION RATE] IN BLOOD: 57 MM/HR — HIGH (ref 0–20)
GLUCOSE SERPL-MCNC: 141 MG/DL — HIGH (ref 70–99)
HCT VFR BLD CALC: 49.1 % — SIGNIFICANT CHANGE UP (ref 39–50)
HGB BLD-MCNC: 16 G/DL — SIGNIFICANT CHANGE UP (ref 13–17)
IMM GRANULOCYTES NFR BLD AUTO: 0.5 % — SIGNIFICANT CHANGE UP (ref 0–0.9)
INR BLD: 0.91 RATIO — SIGNIFICANT CHANGE UP (ref 0.88–1.16)
LYMPHOCYTES # BLD AUTO: 2.26 K/UL — SIGNIFICANT CHANGE UP (ref 1–3.3)
LYMPHOCYTES # BLD AUTO: 24.2 % — SIGNIFICANT CHANGE UP (ref 13–44)
MCHC RBC-ENTMCNC: 32.1 PG — SIGNIFICANT CHANGE UP (ref 27–34)
MCHC RBC-ENTMCNC: 32.6 GM/DL — SIGNIFICANT CHANGE UP (ref 32–36)
MCV RBC AUTO: 98.4 FL — SIGNIFICANT CHANGE UP (ref 80–100)
MONOCYTES # BLD AUTO: 0.85 K/UL — SIGNIFICANT CHANGE UP (ref 0–0.9)
MONOCYTES NFR BLD AUTO: 9.1 % — SIGNIFICANT CHANGE UP (ref 2–14)
NEUTROPHILS # BLD AUTO: 5.63 K/UL — SIGNIFICANT CHANGE UP (ref 1.8–7.4)
NEUTROPHILS NFR BLD AUTO: 60.2 % — SIGNIFICANT CHANGE UP (ref 43–77)
NRBC # BLD: 0 /100 WBCS — SIGNIFICANT CHANGE UP (ref 0–0)
PLATELET # BLD AUTO: 197 K/UL — SIGNIFICANT CHANGE UP (ref 150–400)
POTASSIUM SERPL-MCNC: 4.2 MMOL/L — SIGNIFICANT CHANGE UP (ref 3.5–5.3)
POTASSIUM SERPL-SCNC: 4.2 MMOL/L — SIGNIFICANT CHANGE UP (ref 3.5–5.3)
PROT SERPL-MCNC: 7.3 G/DL — SIGNIFICANT CHANGE UP (ref 6–8.3)
PROTHROM AB SERPL-ACNC: 10.6 SEC — SIGNIFICANT CHANGE UP (ref 10.5–13.4)
RBC # BLD: 4.99 M/UL — SIGNIFICANT CHANGE UP (ref 4.2–5.8)
RBC # FLD: 13.7 % — SIGNIFICANT CHANGE UP (ref 10.3–14.5)
RH IG SCN BLD-IMP: POSITIVE — SIGNIFICANT CHANGE UP
SARS-COV-2 RNA SPEC QL NAA+PROBE: SIGNIFICANT CHANGE UP
SODIUM SERPL-SCNC: 140 MMOL/L — SIGNIFICANT CHANGE UP (ref 135–145)
WBC # BLD: 9.35 K/UL — SIGNIFICANT CHANGE UP (ref 3.8–10.5)
WBC # FLD AUTO: 9.35 K/UL — SIGNIFICANT CHANGE UP (ref 3.8–10.5)

## 2023-03-04 PROCEDURE — 99285 EMERGENCY DEPT VISIT HI MDM: CPT

## 2023-03-04 PROCEDURE — 75635 CT ANGIO ABDOMINAL ARTERIES: CPT | Mod: 26

## 2023-03-04 PROCEDURE — 99223 1ST HOSP IP/OBS HIGH 75: CPT | Mod: GC

## 2023-03-04 PROCEDURE — 93925 LOWER EXTREMITY STUDY: CPT | Mod: 26

## 2023-03-04 PROCEDURE — 93923 UPR/LXTR ART STDY 3+ LVLS: CPT | Mod: 26

## 2023-03-04 RX ORDER — ACETAMINOPHEN 500 MG
650 TABLET ORAL EVERY 6 HOURS
Refills: 0 | Status: DISCONTINUED | OUTPATIENT
Start: 2023-03-04 | End: 2023-03-09

## 2023-03-04 RX ORDER — SODIUM CHLORIDE 9 MG/ML
1000 INJECTION INTRAMUSCULAR; INTRAVENOUS; SUBCUTANEOUS ONCE
Refills: 0 | Status: COMPLETED | OUTPATIENT
Start: 2023-03-04 | End: 2023-03-04

## 2023-03-04 RX ORDER — ENOXAPARIN SODIUM 100 MG/ML
40 INJECTION SUBCUTANEOUS EVERY 24 HOURS
Refills: 0 | Status: DISCONTINUED | OUTPATIENT
Start: 2023-03-04 | End: 2023-03-09

## 2023-03-04 RX ORDER — CYCLOBENZAPRINE HYDROCHLORIDE 10 MG/1
1 TABLET, FILM COATED ORAL
Qty: 0 | Refills: 0 | DISCHARGE

## 2023-03-04 RX ORDER — NICOTINE POLACRILEX 2 MG
1 GUM BUCCAL DAILY
Refills: 0 | Status: DISCONTINUED | OUTPATIENT
Start: 2023-03-04 | End: 2023-03-09

## 2023-03-04 RX ORDER — SODIUM CHLORIDE 9 MG/ML
1000 INJECTION INTRAMUSCULAR; INTRAVENOUS; SUBCUTANEOUS
Refills: 0 | Status: DISCONTINUED | OUTPATIENT
Start: 2023-03-04 | End: 2023-03-09

## 2023-03-04 RX ADMIN — SODIUM CHLORIDE 1000 MILLILITER(S): 9 INJECTION INTRAMUSCULAR; INTRAVENOUS; SUBCUTANEOUS at 15:12

## 2023-03-04 RX ADMIN — ENOXAPARIN SODIUM 40 MILLIGRAM(S): 100 INJECTION SUBCUTANEOUS at 18:16

## 2023-03-04 RX ADMIN — SODIUM CHLORIDE 50 MILLILITER(S): 9 INJECTION INTRAMUSCULAR; INTRAVENOUS; SUBCUTANEOUS at 17:52

## 2023-03-04 RX ADMIN — SODIUM CHLORIDE 50 MILLILITER(S): 9 INJECTION INTRAMUSCULAR; INTRAVENOUS; SUBCUTANEOUS at 19:57

## 2023-03-04 NOTE — H&P ADULT - ASSESSMENT
77 m with    Cyanotic toe PVD  - pain control  - CTA abdomen with runoff  - Vascular evaluation  - Podiatry evaluation  - Nephrology evaluation for optimization Dr Mcpherson  - gentle IVF    HTN  - control    Smoking  - Nicotine patch  - cessation advised    DVT prophylaxis    Further action as per clinical course     Howard Gillette MD phone [phone]

## 2023-03-04 NOTE — H&P ADULT - NSHPSOCIALHISTORY_GEN_ALL_CORE
Social History:    Marital Status:  ( x  )    (   ) Single    (   )    (  )   Occupation:   Lives with: (  ) alone  (  ) children   (x  ) spouse   (  ) parents  (  ) other    Substance Use (street drugs): (  x) never used  (  ) other:  Tobacco Usage:  (   ) never smoked   (   ) former smoker   ( x  ) current smoker  (     ) pack years  (        ) last cigarette date  Alcohol Usage: denies    (     ) Advanced Directives: (     ) None    (      ) DNR    (     ) DNI    (     ) Health Care Proxy:

## 2023-03-04 NOTE — ED ADULT NURSE REASSESSMENT NOTE - NS ED NURSE REASSESS COMMENT FT1
Report received from Jorje BILLINGSLEY in pink. Patient seen sleeping, easily arousable. Patient appears comfortable at this time. R toe noted to be discolored. Sensations intact, reports pain on top of R toe upon palpation. Awaiting dispo. Safety and comfort provided.

## 2023-03-04 NOTE — ED PROVIDER NOTE - PROGRESS NOTE DETAILS
Attending MD Slater. Pt signed out to me in stable condition pending CTA, TBA to medicine, podiatry, 76 yo male for PAD eval, R toe cyanotic x 1 mo with inc pain/claudication with ambulation. Jill Zavala MD PGY1: pt signed out to me pending vascular consult, duplex US. vascular at bedside, requesting CT chest/abd with runoff to legs to further evaluate. podiatry paged, CTA ordered, plan TBA medicine for further work up and management

## 2023-03-04 NOTE — H&P ADULT - NSICDXPASTMEDICALHX_GEN_ALL_CORE_FT
PAST MEDICAL HISTORY:  BPH (benign prostatic hypertrophy)     Laryngeal mass "pre-cancerous" May 2015-- to have repeat biopsy on 5-11-15    Lumbar disc disease     Snoring JONATHAN precautions -- responds affirmatively to STOP BANG questionnaire -- admits to loud snoring; age > 50; gender, male    Thyroid nodule findings on xray -- subsequent sonogram and biopsy (5 years ago)    Umbilical hernia

## 2023-03-04 NOTE — ED ADULT NURSE NOTE - NSICDXPASTMEDICALHX_GEN_ALL_CORE_FT
PAST MEDICAL HISTORY:  BPH (benign prostatic hypertrophy)     Laryngeal mass "pre-cancerous" May 2015-- to have repeat biopsy on 5-11-15    Lumbar disc disease     No pertinent past medical history     Snoring JONATHAN precautions -- responds affirmatively to STOP BANG questionnaire -- admits to loud snoring; age > 50; gender, male    Thyroid nodule findings on xray -- subsequent sonogram and biopsy (5 years ago)    Umbilical hernia

## 2023-03-04 NOTE — ED ADULT NURSE NOTE - NSICDXPASTSURGICALHX_GEN_ALL_CORE_FT
PAST SURGICAL HISTORY:  History of colonoscopy 2014    Laryngeal mass biopsy with "pre-cancerous lesion" -- to have repeat biopsy 5-11-15    S/P tonsillectomy Age 10    Thyroid nodule sonogram and biopsy (5 years ago)

## 2023-03-04 NOTE — ED PROVIDER NOTE - OBJECTIVE STATEMENT
77Y M, pmhx HTN, HLD, neuropathy, pt c/o right toe pain with discoloration for 1 month. Pt states he noted pain and slight discoloration of toe a month ago which has been worsening. has seen PCP, podiatrist, was told to see a vascular doctor,  pain and discoloration has increased, saw pcp yesterday and was told to go to ED. had negative x-rays at outpatient clinic as per patient. Pt presents to ED with 5/10 pain, 8/10 when toe is palpated  pt denies any fever, cp, palpitations, sob, abdominal pain, v/d, dysuria, numbness  falls or trauma or wounds/.

## 2023-03-04 NOTE — ED PROVIDER NOTE - PHYSICAL EXAMINATION
Vital signs reviewed  GENERAL: Patient nontoxic appearing, NAD  HEAD: NCAT  EYES: Anicteric  ENT: MMM  NECK: Supple, non tender  RESPIRATORY: Normal respiratory effort. CTA B/L. No wheezing, rales, rhonchi  CARDIOVASCULAR: Regular rate and rhythm  ABDOMEN: Soft. Nondistended. Nontender. No guarding or rebound. No CVA tenderness.  MUSCULOSKELETAL/EXTREMITIES: Brisk cap refill. 2+ radial pulses. No leg edema.  +R great toe purplish discoloration with moderate tenderness. cap refill 4s. 1+dp pulse, unable to palpate PT pulse.   no wounds noted.   SKIN:  Warm and dry  NEURO: AAOx3. No gross FND.  PSYCHIATRIC: Cooperative. Affect appropriate.

## 2023-03-04 NOTE — CONSULT NOTE ADULT - ASSESSMENT
77M presents with right foot hallux early ischemic changes.  - Pt seen and evaluated.  - Afebrile, WBC 9.35, ESR 57, CRP 0.4.  - Right foot hallux early ischemic changes to the level of PIPJ, no edema. BL foot no open wounds, no acute signs of infection.  - Right Foot X-Rays: Ordered.  - JOAQUÍN/PVRs: RABI 0.76, RTBI 0.39, LABI 0.97, LTBI 0.26. RPVR decreased amplitude at right thigh/calf/ankle levels, LPVR decreased amplitude at ankle and great toe levels. 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  - Vasc planning CTA, appreciated.  - Pod Plan: Monitor ischemic changes pending right foot x-ray.  - Discussed with attending.

## 2023-03-04 NOTE — ED PROVIDER NOTE - ATTENDING CONTRIBUTION TO CARE
Attending (Louie Goss D.O.):  I have personally seen and examined this patient. I have performed a substantive portion of the visit including all aspects of the medical decision making. Resident, fellow, student, and/or ACP note reviewed. I agree on the plan of care except where noted.    77-year-old male with history of hypertension high cholesterol, neuropathy here with right first toe discoloration ongoing for about a month with increasing pain with ambulation.  Patient states that when he walks he gets leg heaviness that improves with rest.  Denies trauma to the region, no prior history of gout.  Denies fever or chills.  Saw podiatrist at this PCP who said that this is likely not an infection.  Was told to go see a vascular doctor but never did.  Had negative outpatient x-rays.  Here patient is hemodynamically stable in no acute distress, 1+ palpable DP pulse on the right foot, 2+ PT pulse, 2+ popliteal pulse bilaterally, 2+ femoral pulse bilaterally.  Left lower extremity with 2+ pulses DP and PT.  Right great toe with purpleish bluish discoloration and tenderness to palpation, tenderness with range of motion of this toe.  No open wounds visualized, no crepitations or fluctuance around this region.  Otherwise sensation is intact to touch with full range of motion of the remainder of the left lower extremity and right lower extremity at all joints.  History and exam suggestive of peripheral arterial disease, with no signs of acute limb ischemia at this time requiring emergent heparinization with vascular consult.  Will obtain VA arterial duplex of bilateral lower extremities scrutinize vascular blood flow further.  No signs of infection or gangrene at this time.  No current indication for antibiotics. If ischemia, patient w/o hx of vasculitis or afib/other predisposing prothrombotic/embolic states. Will need further w/u. -> patient signed out pending remainder of w/u, close reassessments, discussion of results, dispo. Attending (Louie Goss D.O.):  I have personally seen and examined this patient. I have performed a substantive portion of the visit including all aspects of the medical decision making. Resident, fellow, student, and/or ACP note reviewed. I agree on the plan of care except where noted.    77-year-old male with history of hypertension high cholesterol, neuropathy here with right first toe discoloration ongoing for about a month with increasing pain with ambulation.  Patient states that when he walks he gets leg heaviness that improves with rest.  Denies trauma to the region, no prior history of gout.  Denies fever or chills.  Saw podiatrist at this PCP who said that this is likely not an infection.  Was told to go see a vascular doctor but never did.  Had negative outpatient x-rays.  Here patient is hemodynamically stable in no acute distress, 1+ palpable DP pulse on the right foot, 2+ PT pulse, 2+ popliteal pulse bilaterally, 2+ femoral pulse bilaterally.  Left lower extremity with 1+ pulses PT pulse, no doppler or palpable DP pulse.  Right great toe with purpleish bluish discoloration and tenderness to palpation, tenderness with range of motion of this toe.  No open wounds visualized, no crepitations or fluctuance around this region.  Otherwise sensation is intact to touch with full range of motion of the remainder of the left lower extremity and right lower extremity at all joints.  History and exam suggestive of peripheral arterial disease, with no signs of acute limb ischemia at this time requiring emergent heparinization with vascular consult.  Will obtain VA arterial duplex of bilateral lower extremities scrutinize vascular blood flow further.  No signs of infection or gangrene at this time.  No current indication for antibiotics. If ischemia, patient w/o hx of vasculitis or afib/other predisposing prothrombotic/embolic states. Will need further w/u. -> patient signed out pending remainder of w/u, close reassessments, discussion of results, dispo.

## 2023-03-04 NOTE — ED ADULT NURSE REASSESSMENT NOTE - NS ED NURSE REASSESS COMMENT FT1
1025 got pt from Sebastien Brian RN ( no report), pt awake, alert, c/o R 1st toe pain ( 9/10) , denies cp, sob, n/v/d, weakness , pt awaiting for vascular test .

## 2023-03-04 NOTE — H&P ADULT - NSHPLABSRESULTS_GEN_ALL_CORE
16.0   9.35  )-----------( 197      ( 04 Mar 2023 06:53 )             49.1       03-04    140  |  105  |  26<H>  ----------------------------<  141<H>  4.2   |  21<L>  |  1.32<H>    Ca    9.3      04 Mar 2023 06:53    TPro  7.3  /  Alb  4.1  /  TBili  0.2  /  DBili  x   /  AST  22  /  ALT  20  /  AlkPhos  61  03-04                  PT/INR - ( 04 Mar 2023 06:53 )   PT: 10.6 sec;   INR: 0.91 ratio         PTT - ( 04 Mar 2023 06:53 )  PTT:27.1 sec    < from: VA Duplex Lower Extrem Arterial, Bilat (03.04.23 @ 13:11) >    Impression:    Diffuse atherosclerotic changes are noted bilaterally. Tardus parvus   waveforms are seen throughout the right lower extremity consistent with a   proximal lesion likely at the right common iliac/external iliac artery   level. Low velocity flow is seen in the right lower extremity arteries.   The right peroneal artery wasnot visualized. There is severe stenosis in   the proximal right anterior tibial artery.    No significant stenosis is seen in the left external iliac, common   femoral, deep femoral, superficial femoral or popliteal arteries. The   left peroneal artery is not visualized. The left anterior tibial and   dorsalis pedis arteries appear occluded.    EKG

## 2023-03-04 NOTE — CONSULT NOTE ADULT - SUBJECTIVE AND OBJECTIVE BOX
seen examined. CKD III suspected cyanotic first right toe. for CTA. ivf hydration to cont full consult to follow  seen examined. CKD III suspected cyanotic first right toe. for CTA. ivf hydration to cont full consult to follow     [facility]  [phone]  NEPHROLOGY      INITIAL CONSULT NOTE  --------------------------------------------------------------------------------  HPI:      77Y M, pmhx HTN, HLD, neuropathy, pt c/o right toe pain with discoloration for 1 month. Pt states he noted pain and slight discoloration of toe a month ago which has been worsening. has seen PCP, podiatrist, was told to see a vascular doctor,  pain and discoloration has increased, saw pcp  and was told to go to ED. had negative x-rays at outpatient clinic as per patient. Pt presents to ED with 5/10 pain, 8/10 when toe is palpated.   	pt denies any fever, cp, palpitations, sob, abdominal pain, v/d, dysuria, numbness  falls or trauma or wounds now scheduled for CTA. noticed with GFR 56 cc/min renal consult called.       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-- to have repeat biopsy on 5-11-15      Snoring  JONATHAN precautions -- responds affirmatively to STOP BANG questionnaire -- admits to loud snoring; age &gt; 50; gender, male      BPH (benign prostatic hypertrophy)      Lumbar disc disease      Thyroid nodule  findings on xray -- subsequent sonogram and biopsy (5 years ago)      Umbilical hernia      S/P tonsillectomy  Age 10      History of colonoscopy  2014      Laryngeal mass  biopsy with &quot;pre-cancerous lesion&quot; -- to have repeat biopsy 5-11-15      Thyroid nodule  sonogram and biopsy (5 years ago)        FAMILY HISTORY:  No pertinent family history in first degree relatives    No pertinent family history in first degree relatives      PAST SOCIAL HISTORY:    ALLERGIES & MEDICATIONS  --------------------------------------------------------------------------------  Allergies    No Known Allergies    Intolerances      Standing Inpatient Medications  enoxaparin Injectable 40 milliGRAM(s) SubCutaneous every 24 hours  nicotine - 21 mG/24Hr(s) Patch 1 Patch Transdermal daily  sodium chloride 0.9%. 1000 milliLiter(s) IV Continuous <Continuous>    PRN Inpatient Medications  acetaminophen     Tablet .. 650 milliGRAM(s) Oral every 6 hours PRN      REVIEW OF SYSTEMS  --------------------------------------------------------------------------------  Gen: No  fevers/chills   Skin: No rashes  Head/Eyes/Ears/Mouth: No headache; Normal hearing;  No sinus pain/discomfort, sore throat  Respiratory: No dyspnea, cough, wheezing  CV: No chest pain, orthopnea  GI: No abdominal pain, diarrhea, nausea, vomiting, melena  : No dysuria, decrease urination or hesitancy urinating  hematuria, nocturia  MSK: No joint pain/swelling; no back pain  Neuro: No dizziness/lightheadedness  also with no edema     VITALS/PHYSICAL EXAM  --------------------------------------------------------------------------------  T(C): 36.6 (03-04-23 @ 21:52), Max: 36.9 (03-04-23 @ 07:43)  HR: 70 (03-04-23 @ 21:52) (66 - 85)  BP: 140/70 (03-04-23 @ 21:52) (136/85 - 172/82)  RR: 18 (03-04-23 @ 21:52) (15 - 18)  SpO2: 95% (03-04-23 @ 21:52) (95% - 99%)  Wt(kg): --  Height (cm): 185.4 (03-04-23 @ 05:45)  Weight (kg): 113.4 (03-04-23 @ 05:45)  BMI (kg/m2): 33 (03-04-23 @ 05:45)  BSA (m2): 2.37 (03-04-23 @ 05:45)      03-04-23 @ 07:01  -  03-04-23 @ 22:52  --------------------------------------------------------  IN: 200 mL / OUT: 0 mL / NET: 200 mL      Physical Exam:  	Gen: Non toxic comfortable appearing   	no jvd  	Pulm: decrease bs  no rales or ronchi or wheezing  	CV: RRR, S1S2; no rub  	Abd: +BS, soft, nontender/nondistended  	UE: Warm, no cyanosis  no clubbing,  no edema;  	LE: Warm, no edema decrease pulse right foot + Right big toe cyanotic   	Neuro: alert and oriented. speech coherent   	Skin: Warm, no decrease skin turgor  right foot first toe cyanotic       LABS/STUDIES  --------------------------------------------------------------------------------              16.0   9.35  >-----------<  197      [03-04-23 @ 06:53]              49.1     140  |  105  |  26  ----------------------------<  141      [03-04-23 @ 06:53]  4.2   |  21  |  1.32        Ca     9.3     [03-04-23 @ 06:53]    TPro  7.3  /  Alb  4.1  /  TBili  0.2  /  DBili  x   /  AST  22  /  ALT  20  /  AlkPhos  61  [03-04-23 @ 06:53]    PT/INR: PT 10.6 , INR 0.91       [03-04-23 @ 06:53]  PTT: 27.1       [03-04-23 @ 06:53]      Creatinine Trend:  SCr 1.32 [03-04 @ 06:53]

## 2023-03-04 NOTE — ED PROVIDER NOTE - CLINICAL SUMMARY MEDICAL DECISION MAKING FREE TEXT BOX
77Y M, pmhx HTN, HLD, neuropathy, pt c/o right toe pain with discoloration for 1 month.  tenderness and color change to great toe. vss, 1+ dp pulse. unable to palpate pt pulse. moderate tenderness  concern for pad, vessel occlusion. eval with labs, duplex imaging. likely vascular eval

## 2023-03-04 NOTE — ED ADULT NURSE NOTE - OBJECTIVE STATEMENT
Pt is 77Y M, pmhx HTN, HLD, neuropathy, pt c/o right toe pain with discoloration for 1 month. Pt states he noted pain and slight discoloration of toe a month ago, has seen PCP, podiatrist, was told to see a vascular doctor, has not seen doctor yet, pain and discoloration has increased, saw pcp yesterday and was told to go to ED. Pt presents to ED with 5/10 pain, 8/10 when toe is palpated, pt still has sensation in toe, pt toe is purple/blue color, pt right pedal pulse palpable +1, slightly warm, pt left foot pedal pulse palpable +2, warm, and no issues. Pt states he did not suffer any trauma to toe or any recent cuts, pt denies any fever, chills, NVD, SOB, chest pain, or any issues, pt A&Ox3, ambulatory, updated on plan of care

## 2023-03-04 NOTE — CONSULT NOTE ADULT - SUBJECTIVE AND OBJECTIVE BOX
Patient is a 77y old  Male who presents with a chief complaint of R TOe (04 Mar 2023 14:50)      HPI:  77Y M, pmhx HTN, HLD, neuropathy, pt c/o right toe pain with discoloration for 1 month. Pt states he noted pain and slight discoloration of toe a month ago which has been worsening. has seen PCP, podiatrist, was told to see a vascular doctor,  pain and discoloration has increased, saw pcp yesterday and was told to go to ED. had negative x-rays at outpatient clinic as per patient. Pt presents to ED with 5/10 pain, 8/10 when toe is palpated  	pt denies any fever, cp, palpitations, sob, abdominal pain, v/d, dysuria, numbness  falls or trauma or wounds/. 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-- to have repeat biopsy on 5-11-15      Snoring  JONATHAN precautions -- responds affirmatively to STOP BANG questionnaire -- admits to loud snoring; age &gt; 50; gender, male      BPH (benign prostatic hypertrophy)      Lumbar disc disease      Thyroid nodule  findings on xray -- subsequent sonogram and biopsy (5 years ago)      Umbilical hernia      S/P tonsillectomy  Age 10      History of colonoscopy  2014      Laryngeal mass  biopsy with &quot;pre-cancerous lesion&quot; -- to have repeat biopsy 5-11-15      Thyroid nodule  sonogram and biopsy (5 years ago)          MEDICATIONS  (STANDING):  enoxaparin Injectable 40 milliGRAM(s) SubCutaneous every 24 hours  nicotine - 21 mG/24Hr(s) Patch 1 Patch Transdermal daily  sodium chloride 0.9%. 1000 milliLiter(s) (50 mL/Hr) IV Continuous <Continuous>    MEDICATIONS  (PRN):  acetaminophen     Tablet .. 650 milliGRAM(s) Oral every 6 hours PRN Temp greater or equal to 38.5C (101.3F), Mild Pain (1 - 3)      Allergies    No Known Allergies    Intolerances        VITALS:    Vital Signs Last 24 Hrs  T(C): 36.6 (04 Mar 2023 10:27), Max: 36.9 (04 Mar 2023 07:43)  T(F): 97.8 (04 Mar 2023 10:27), Max: 98.4 (04 Mar 2023 07:43)  HR: 67 (04 Mar 2023 13:49) (66 - 85)  BP: 149/88 (04 Mar 2023 13:49) (148/82 - 172/82)  BP(mean): --  RR: 18 (04 Mar 2023 13:49) (15 - 18)  SpO2: 96% (04 Mar 2023 13:49) (95% - 99%)    Parameters below as of 04 Mar 2023 13:49  Patient On (Oxygen Delivery Method): room air        LABS:                          16.0   9.35  )-----------( 197      ( 04 Mar 2023 06:53 )             49.1       03-04    140  |  105  |  26<H>  ----------------------------<  141<H>  4.2   |  21<L>  |  1.32<H>    Ca    9.3      04 Mar 2023 06:53    TPro  7.3  /  Alb  4.1  /  TBili  0.2  /  DBili  x   /  AST  22  /  ALT  20  /  AlkPhos  61  03-04      CAPILLARY BLOOD GLUCOSE          PT/INR - ( 04 Mar 2023 06:53 )   PT: 10.6 sec;   INR: 0.91 ratio         PTT - ( 04 Mar 2023 06:53 )  PTT:27.1 sec    LOWER EXTREMITY PHYSICAL EXAM:    Vascular: DP/PT 0/4, B/L, CFT <3 seconds B/L, Temperature gradient warm to cool, B/L.   Neuro: Epicritic sensation intact to the level of toes, B/L.  Musculoskeletal/Ortho: Unremarkable.  Skin: Right foot hallux early ischemic changes to the level of PIPJ, no edema. BL foot no open wounds, no acute signs of infection.      RADIOLOGY & ADDITIONAL STUDIES:

## 2023-03-04 NOTE — H&P ADULT - NSHPPHYSICALEXAM_GEN_ALL_CORE
PHYSICAL EXAMINATION:  Vital Signs Last 24 Hrs  T(C): 36.6 (04 Mar 2023 10:27), Max: 36.9 (04 Mar 2023 07:43)  T(F): 97.8 (04 Mar 2023 10:27), Max: 98.4 (04 Mar 2023 07:43)  HR: 67 (04 Mar 2023 13:49) (66 - 85)  BP: 149/88 (04 Mar 2023 13:49) (148/82 - 172/82)  BP(mean): --  RR: 18 (04 Mar 2023 13:49) (15 - 18)  SpO2: 96% (04 Mar 2023 13:49) (95% - 99%)    Parameters below as of 04 Mar 2023 13:49  Patient On (Oxygen Delivery Method): room air      CAPILLARY BLOOD GLUCOSE          GENERAL: NAD   HEAD:  atraumatic, normocephalic  EYES: sclera anicteric  ENMT: mucous membranes moist  NECK: supple, No JVD  CHEST/LUNG: clear to auscultation bilaterally; no rales, rhonchi, or wheezing b/l  HEART: normal S1, S2  ABDOMEN: BS+, soft, ND, NT   EXTREMITIES:  pulses palpable; no clubbing, R toe cyanosis  NEURO: awake, alert, interactive; moves all extremities  SKIN: no rashes or lesions

## 2023-03-04 NOTE — ED ADULT NURSE REASSESSMENT NOTE - NS ED NURSE REASSESS COMMENT FT1
Patient being transported to ultrasound/vascular  with transport personnel. Patient stable upon leaving ED.

## 2023-03-04 NOTE — CONSULT NOTE ADULT - ASSESSMENT
77Y M, pmhx HTN, HLD, neuropathy, pt c/o right toe pain with discoloration for 1 month. Pt states he noted pain and slight discoloration of toe a month ago which has been worsening.    1- CKD III likely   2- HTN hx   3- PAD likely    ivf chacorta CTA LE to mitigate risk of contrast nephropathy. NS @ 50 cc/hr   to have echo   vascular consult   trend bp

## 2023-03-04 NOTE — PATIENT PROFILE ADULT - FALL HARM RISK - HARM RISK INTERVENTIONS

## 2023-03-04 NOTE — CONSULT NOTE ADULT - SUBJECTIVE AND OBJECTIVE BOX
CAlled 30 min ago to see pt.  Seen ex'ed w fellow/res in ER.  Son present.  Pt presents w painful/tender blue R 1st toe x few wks.  No obvious etiology.  Denies previous vasc issues.  No wounds/infection/swelling.  Has ch b/l buttocks/thighs cramps w walking.  Pain/discomfort constant incl at night but indp of elevation/lowering.  Able to walk.    PMH: LBP  PSH: No  Meds: no antiplt/AC  NKDA  SMOKER  No CP/SOB/N/V/D/F/C  FH: no aneurysms    AAO, SANA  Eddie  No JVD/icterus  Affect ok  Abd: S/NT, no pulsatility  LEs:   R 1st toe nonblanching cyanosis/tender.  No infection.  Otherwise LEs appear ok.  +rad/fems b/l  + L pop/L PT  R pop/pedals NP    Cr 1.32  JOAQUÍN/PVRs: cw PAD carmencita R      A/P:   PAD/R toe isch.  Limb potentially threatened.      Rec:   Optimize med mgmt  Renal  Podiatry fu  Will need expeditious vasc henderson +/- intervention.  CTA C/A/P/runoff when renal ok.  Will fu

## 2023-03-05 LAB
ANION GAP SERPL CALC-SCNC: 13 MMOL/L — SIGNIFICANT CHANGE UP (ref 5–17)
BUN SERPL-MCNC: 22 MG/DL — SIGNIFICANT CHANGE UP (ref 7–23)
CALCIUM SERPL-MCNC: 8.9 MG/DL — SIGNIFICANT CHANGE UP (ref 8.4–10.5)
CHLORIDE SERPL-SCNC: 105 MMOL/L — SIGNIFICANT CHANGE UP (ref 96–108)
CO2 SERPL-SCNC: 20 MMOL/L — LOW (ref 22–31)
CREAT SERPL-MCNC: 1.41 MG/DL — HIGH (ref 0.5–1.3)
EGFR: 52 ML/MIN/1.73M2 — LOW
GLUCOSE SERPL-MCNC: 125 MG/DL — HIGH (ref 70–99)
HCV AB S/CO SERPL IA: 0.15 S/CO — SIGNIFICANT CHANGE UP (ref 0–0.99)
HCV AB SERPL-IMP: SIGNIFICANT CHANGE UP
POTASSIUM SERPL-MCNC: 3.9 MMOL/L — SIGNIFICANT CHANGE UP (ref 3.5–5.3)
POTASSIUM SERPL-SCNC: 3.9 MMOL/L — SIGNIFICANT CHANGE UP (ref 3.5–5.3)
SODIUM SERPL-SCNC: 138 MMOL/L — SIGNIFICANT CHANGE UP (ref 135–145)

## 2023-03-05 PROCEDURE — 99232 SBSQ HOSP IP/OBS MODERATE 35: CPT | Mod: GC

## 2023-03-05 PROCEDURE — 73630 X-RAY EXAM OF FOOT: CPT | Mod: 26,RT

## 2023-03-05 RX ORDER — ASPIRIN/CALCIUM CARB/MAGNESIUM 324 MG
81 TABLET ORAL DAILY
Refills: 0 | Status: DISCONTINUED | OUTPATIENT
Start: 2023-03-05 | End: 2023-03-09

## 2023-03-05 RX ADMIN — Medication 1 PATCH: at 18:19

## 2023-03-05 RX ADMIN — Medication 81 MILLIGRAM(S): at 17:28

## 2023-03-05 RX ADMIN — Medication 1 PATCH: at 10:42

## 2023-03-05 RX ADMIN — SODIUM CHLORIDE 50 MILLILITER(S): 9 INJECTION INTRAMUSCULAR; INTRAVENOUS; SUBCUTANEOUS at 10:42

## 2023-03-05 RX ADMIN — ENOXAPARIN SODIUM 40 MILLIGRAM(S): 100 INJECTION SUBCUTANEOUS at 17:28

## 2023-03-05 NOTE — PROGRESS NOTE ADULT - SUBJECTIVE AND OBJECTIVE BOX
Patient is a 77y old  Male who presents with a chief complaint of R TOe (04 Mar 2023 14:50)      SUBJECTIVE / OVERNIGHT EVENTS: No new complaints.   Review of Systems  chest pain no  palpitations no  sob no  nausea no  headache no    MEDICATIONS  (STANDING):  aspirin enteric coated 81 milliGRAM(s) Oral daily  enoxaparin Injectable 40 milliGRAM(s) SubCutaneous every 24 hours  nicotine - 21 mG/24Hr(s) Patch 1 Patch Transdermal daily  sodium chloride 0.9%. 1000 milliLiter(s) (50 mL/Hr) IV Continuous <Continuous>    MEDICATIONS  (PRN):  acetaminophen     Tablet .. 650 milliGRAM(s) Oral every 6 hours PRN Temp greater or equal to 38.5C (101.3F), Mild Pain (1 - 3)      Vital Signs Last 24 Hrs  T(C): 36.6 (05 Mar 2023 12:08), Max: 36.6 (04 Mar 2023 21:52)  T(F): 97.9 (05 Mar 2023 12:08), Max: 97.9 (05 Mar 2023 04:55)  HR: 63 (05 Mar 2023 12:08) (63 - 70)  BP: 134/80 (05 Mar 2023 12:08) (125/73 - 142/83)  BP(mean): --  RR: 18 (05 Mar 2023 12:08) (18 - 18)  SpO2: 94% (05 Mar 2023 12:08) (93% - 96%)    Parameters below as of 05 Mar 2023 12:08  Patient On (Oxygen Delivery Method): room air        PHYSICAL EXAM:  GENERAL: NAD, well-developed  HEAD:  Atraumatic, Normocephalic  EYES: EOMI, PERRLA, conjunctiva and sclera clear  NECK: Supple, No JVD  CHEST/LUNG: Clear to auscultation bilaterally; No wheeze  HEART: Regular rate and rhythm; No murmurs, rubs, or gallops  ABDOMEN: Soft, Nontender, Nondistended; Bowel sounds present  EXTREMITIES: R toe cyanotic   PSYCH: AAOx3  NEUROLOGY: non-focal  SKIN: No rashes or lesions    LABS:                        16.0   9.35  )-----------( 197      ( 04 Mar 2023 06:53 )             49.1     03-05    138  |  105  |  22  ----------------------------<  125<H>  3.9   |  20<L>  |  1.41<H>    Ca    8.9      05 Mar 2023 07:15    TPro  7.3  /  Alb  4.1  /  TBili  0.2  /  DBili  x   /  AST  22  /  ALT  20  /  AlkPhos  61  03-04    PT/INR - ( 04 Mar 2023 06:53 )   PT: 10.6 sec;   INR: 0.91 ratio         PTT - ( 04 Mar 2023 06:53 )  PTT:27.1 sec            RADIOLOGY & ADDITIONAL TESTS:    Imaging Personally Reviewed:    Consultant(s) Notes Reviewed:      Care Discussed with Consultants/Other Providers:

## 2023-03-05 NOTE — PROGRESS NOTE ADULT - SUBJECTIVE AND OBJECTIVE BOX
TE[facility] Daily Progress Note  =====================================================    SUBJECTIVE: Patient seen and examined at bedside on AM rounds. Patient reports that they're feeling well. Has no complaints of pain. Reports intact sensation and motor of B/L extremities    --------------------------------------------------------------------------------------  OBJECTIVE:    VITAL SIGNS:  Vital Signs Last 24 Hrs  T(C): 36.6 (05 Mar 2023 12:08), Max: 36.6 (04 Mar 2023 21:52)  T(F): 97.9 (05 Mar 2023 12:08), Max: 97.9 (05 Mar 2023 04:55)  HR: 63 (05 Mar 2023 12:08) (63 - 70)  BP: 134/80 (05 Mar 2023 12:08) (125/73 - 142/83)  BP(mean): --  RR: 18 (05 Mar 2023 12:08) (18 - 18)  SpO2: 94% (05 Mar 2023 12:08) (93% - 96%)    Parameters below as of 05 Mar 2023 12:08  Patient On (Oxygen Delivery Method): room air      --------------------------------------------------------------------------------------    EXAM:  General: NAD, resting in bed comfortably.  Cardiac: regular rate, warm and well perfused  Respiratory: Nonlabored respirations  Extremities: R toe with discoloration/nonblanching cyanosis. Tender to palpation  Vascular: L pop/Pedals Palpable, R pop/Pedals Non-palpable    --------------------------------------------------------------------------------------    LABS:                        16.0   9.35  )-----------( 197      ( 04 Mar 2023 06:53 )             49.1     03-05    138  |  105  |  22  ----------------------------<  125<H>  3.9   |  20<L>  |  1.41<H>    Ca    8.9      05 Mar 2023 07:15    TPro  7.3  /  Alb  4.1  /  TBili  0.2  /  DBili  x   /  AST  22  /  ALT  20  /  AlkPhos  61  03-04    PT/INR - ( 04 Mar 2023 06:53 )   PT: 10.6 sec;   INR: 0.91 ratio         PTT - ( 04 Mar 2023 06:53 )  PTT:27.1 sec      --------------------------------------------------------------------------------------    INS AND OUTS:    03-04-23 @ 07:01  -  03-05-23 @ 07:00  --------------------------------------------------------  IN: 200 mL / OUT: 0 mL / NET: 200 mL        --------------------------------------------------------------------------------------    MEDICATIONS:  MEDICATIONS  (STANDING):  enoxaparin Injectable 40 milliGRAM(s) SubCutaneous every 24 hours  nicotine - 21 mG/24Hr(s) Patch 1 Patch Transdermal daily  sodium chloride 0.9%. 1000 milliLiter(s) (50 mL/Hr) IV Continuous <Continuous>    MEDICATIONS  (PRN):  acetaminophen     Tablet .. 650 milliGRAM(s) Oral every 6 hours PRN Temp greater or equal to 38.5C (101.3F), Mild Pain (1 - 3)    --------------------------------------------------------------------------------------

## 2023-03-05 NOTE — PROGRESS NOTE ADULT - ATTENDING COMMENTS
Fu PAD/R toe isch  Clinically unchanged from yest.  CTA reviewed: Athero.  R TRAVON occ.  WIll need revasc (Likley iliac stents) in OR once ok from med/cardio/renal standpoint.  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

## 2023-03-06 LAB
ANION GAP SERPL CALC-SCNC: 12 MMOL/L — SIGNIFICANT CHANGE UP (ref 5–17)
APPEARANCE UR: CLEAR — SIGNIFICANT CHANGE UP
BACTERIA # UR AUTO: NEGATIVE — SIGNIFICANT CHANGE UP
BILIRUB UR-MCNC: NEGATIVE — SIGNIFICANT CHANGE UP
BUN SERPL-MCNC: 24 MG/DL — HIGH (ref 7–23)
CALCIUM SERPL-MCNC: 8.8 MG/DL — SIGNIFICANT CHANGE UP (ref 8.4–10.5)
CHLORIDE SERPL-SCNC: 108 MMOL/L — SIGNIFICANT CHANGE UP (ref 96–108)
CO2 SERPL-SCNC: 21 MMOL/L — LOW (ref 22–31)
COLOR SPEC: SIGNIFICANT CHANGE UP
CREAT ?TM UR-MCNC: 113 MG/DL — SIGNIFICANT CHANGE UP
CREAT SERPL-MCNC: 1.5 MG/DL — HIGH (ref 0.5–1.3)
DIFF PNL FLD: NEGATIVE — SIGNIFICANT CHANGE UP
EGFR: 48 ML/MIN/1.73M2 — LOW
EPI CELLS # UR: 1 /HPF — SIGNIFICANT CHANGE UP
GLUCOSE SERPL-MCNC: 100 MG/DL — HIGH (ref 70–99)
GLUCOSE UR QL: NEGATIVE — SIGNIFICANT CHANGE UP
HYALINE CASTS # UR AUTO: 1 /LPF — SIGNIFICANT CHANGE UP (ref 0–2)
KETONES UR-MCNC: NEGATIVE — SIGNIFICANT CHANGE UP
LEUKOCYTE ESTERASE UR-ACNC: NEGATIVE — SIGNIFICANT CHANGE UP
NITRITE UR-MCNC: NEGATIVE — SIGNIFICANT CHANGE UP
PH UR: 6 — SIGNIFICANT CHANGE UP (ref 5–8)
POTASSIUM SERPL-MCNC: 3.9 MMOL/L — SIGNIFICANT CHANGE UP (ref 3.5–5.3)
POTASSIUM SERPL-SCNC: 3.9 MMOL/L — SIGNIFICANT CHANGE UP (ref 3.5–5.3)
PROT ?TM UR-MCNC: 16 MG/DL — HIGH (ref 0–12)
PROT UR-MCNC: ABNORMAL
PROT/CREAT UR-RTO: 0.1 RATIO — SIGNIFICANT CHANGE UP (ref 0–0.2)
RBC CASTS # UR COMP ASSIST: 1 /HPF — SIGNIFICANT CHANGE UP (ref 0–4)
SODIUM SERPL-SCNC: 141 MMOL/L — SIGNIFICANT CHANGE UP (ref 135–145)
SP GR SPEC: 1.02 — SIGNIFICANT CHANGE UP (ref 1.01–1.02)
UROBILINOGEN FLD QL: NEGATIVE — SIGNIFICANT CHANGE UP
WBC UR QL: 1 /HPF — SIGNIFICANT CHANGE UP (ref 0–5)

## 2023-03-06 RX ORDER — ATORVASTATIN CALCIUM 80 MG/1
80 TABLET, FILM COATED ORAL AT BEDTIME
Refills: 0 | Status: DISCONTINUED | OUTPATIENT
Start: 2023-03-06 | End: 2023-03-09

## 2023-03-06 RX ADMIN — ATORVASTATIN CALCIUM 80 MILLIGRAM(S): 80 TABLET, FILM COATED ORAL at 21:29

## 2023-03-06 RX ADMIN — Medication 81 MILLIGRAM(S): at 11:51

## 2023-03-06 RX ADMIN — Medication 1 PATCH: at 19:27

## 2023-03-06 RX ADMIN — Medication 1 PATCH: at 11:51

## 2023-03-06 RX ADMIN — ENOXAPARIN SODIUM 40 MILLIGRAM(S): 100 INJECTION SUBCUTANEOUS at 18:25

## 2023-03-06 RX ADMIN — Medication 1 PATCH: at 11:30

## 2023-03-06 RX ADMIN — Medication 1 PATCH: at 08:24

## 2023-03-06 NOTE — PROGRESS NOTE ADULT - SUBJECTIVE AND OBJECTIVE BOX
[facility]   [phone]  RENAL FOLLOW UP NOTE  --------------------------------------------------------------------------------  Chief Complaint:    24 hour events/subjective:    patient seen and examined.   denies sob    PAST HISTORY  --------------------------------------------------------------------------------  No significant changes to PMH, PSH, FHx, SHx, unless otherwise noted    ALLERGIES & MEDICATIONS  --------------------------------------------------------------------------------  Allergies    No Known Allergies    Intolerances      Standing Inpatient Medications  aspirin enteric coated 81 milliGRAM(s) Oral daily  enoxaparin Injectable 40 milliGRAM(s) SubCutaneous every 24 hours  nicotine - 21 mG/24Hr(s) Patch 1 Patch Transdermal daily  sodium chloride 0.9%. 1000 milliLiter(s) IV Continuous <Continuous>    PRN Inpatient Medications  acetaminophen     Tablet .. 650 milliGRAM(s) Oral every 6 hours PRN      REVIEW OF SYSTEMS  --------------------------------------------------------------------------------    Gen: denies fevers/chills,  CVS: denies chest pain/palpitations  Resp: denies SOB/Cough  GI: Denies N/V/Abd pain  : Denies dysuria/oliguria/hematuria    VITALS/PHYSICAL EXAM  --------------------------------------------------------------------------------  T(C): 36.6 (03-06-23 @ 12:19), Max: 36.8 (03-06-23 @ 05:01)  HR: 75 (03-06-23 @ 12:19) (65 - 77)  BP: 139/84 (03-06-23 @ 12:19) (139/84 - 158/87)  RR: 18 (03-06-23 @ 12:19) (18 - 18)  SpO2: 94% (03-06-23 @ 12:19) (93% - 95%)  Wt(kg): --        03-05-23 @ 07:01  -  03-06-23 @ 07:00  --------------------------------------------------------  IN: 240 mL / OUT: 0 mL / NET: 240 mL      Physical Exam:  	  	Gen: Non toxic comfortable appearing   	Pulm: decrease bs  no rales or ronchi or wheezing  	CV: RRR, S1S2; no rub  	Abd: +BS, soft, nontender/nondistended  	UE: Warm, no cyanosis  no clubbing,  no edema;  	LE: Warm, no edema decrease pulse right foot + Right big toe cyanotic     LABS/STUDIES  --------------------------------------------------------------------------------    141  |  108  |  24  ----------------------------<  100      [03-06-23 @ 07:00]  3.9   |  21  |  1.50        Ca     8.8     [03-06-23 @ 07:00]            Creatinine Trend:  SCr 1.50 [03-06 @ 07:00]  SCr 1.41 [03-05 @ 07:15]  SCr 1.32 [03-04 @ 06:53]

## 2023-03-06 NOTE — PROGRESS NOTE ADULT - SUBJECTIVE AND OBJECTIVE BOX
Patient is a 77y old  Male who presents with a chief complaint of R TOe (04 Mar 2023 14:50)      SUBJECTIVE / OVERNIGHT EVENTS: No new complaints.   Review of Systems  chest pain no  palpitations no  sob no  nausea no  headache no    MEDICATIONS  (STANDING):  aspirin enteric coated 81 milliGRAM(s) Oral daily  atorvastatin 80 milliGRAM(s) Oral at bedtime  enoxaparin Injectable 40 milliGRAM(s) SubCutaneous every 24 hours  nicotine - 21 mG/24Hr(s) Patch 1 Patch Transdermal daily  sodium chloride 0.9%. 1000 milliLiter(s) (50 mL/Hr) IV Continuous <Continuous>    MEDICATIONS  (PRN):  acetaminophen     Tablet .. 650 milliGRAM(s) Oral every 6 hours PRN Temp greater or equal to 38.5C (101.3F), Mild Pain (1 - 3)      Vital Signs Last 24 Hrs  T(C): 36.6 (06 Mar 2023 12:19), Max: 36.8 (06 Mar 2023 05:01)  T(F): 97.8 (06 Mar 2023 12:19), Max: 98.2 (06 Mar 2023 05:01)  HR: 75 (06 Mar 2023 12:19) (65 - 77)  BP: 139/84 (06 Mar 2023 12:19) (139/84 - 158/87)  BP(mean): --  RR: 18 (06 Mar 2023 12:19) (18 - 18)  SpO2: 94% (06 Mar 2023 12:19) (93% - 95%)    Parameters below as of 06 Mar 2023 12:19  Patient On (Oxygen Delivery Method): room air        PHYSICAL EXAM:  GENERAL: NAD, well-developed  HEAD:  Atraumatic, Normocephalic  EYES: EOMI, PERRLA, conjunctiva and sclera clear  NECK: Supple, No JVD  CHEST/LUNG: Clear to auscultation bilaterally; No wheeze  HEART: Regular rate and rhythm; No murmurs, rubs, or gallops  ABDOMEN: Soft, Nontender, Nondistended; Bowel sounds present  EXTREMITIES:  R toe cyanotic   PSYCH: AAOx3  NEUROLOGY: non-focal  SKIN: No rashes or lesions    LABS:        141  |  108  |  24<H>  ----------------------------<  100<H>  3.9   |  21<L>  |  1.50<H>    Ca    8.8      06 Mar 2023 07:00            Urinalysis Basic - ( 06 Mar 2023 16:25 )    Color: Light Yellow / Appearance: Clear / S.021 / pH: x  Gluc: x / Ketone: Negative  / Bili: Negative / Urobili: Negative   Blood: x / Protein: Trace / Nitrite: Negative   Leuk Esterase: Negative / RBC: 1 /hpf / WBC 1 /HPF   Sq Epi: x / Non Sq Epi: 1 /hpf / Bacteria: Negative          RADIOLOGY & ADDITIONAL TESTS:    Imaging Personally Reviewed:    Consultant(s) Notes Reviewed:      Care Discussed with Consultants/Other Providers:

## 2023-03-06 NOTE — PROGRESS NOTE ADULT - NS ATTEND AMEND GEN_ALL_CORE FT
Seen, examined with, formulated plan with and  agree with above as scribed by NP Thelma [Aron]     pt with likely ckd III   also with cyanotic toe   with TRAVON occlusion   for angio/stent placement.   d/w pt risks of worsening renal function with angio/stent.   no renal contraindication to the proposed procedure.

## 2023-03-06 NOTE — PROGRESS NOTE ADULT - SUBJECTIVE AND OBJECTIVE BOX
Patient is a 77y old  Male who presents with a chief complaint of R TOe (04 Mar 2023 14:50)       INTERVAL HPI/OVERNIGHT EVENTS:  Patient seen and evaluated at bedside.  Pt is resting comfortable in NAD. Denies N/V/F/C.  Pain rated at X/10    Allergies    No Known Allergies    Intolerances        Vital Signs Last 24 Hrs  T(C): 36.8 (06 Mar 2023 05:01), Max: 36.8 (06 Mar 2023 05:01)  T(F): 98.2 (06 Mar 2023 05:01), Max: 98.2 (06 Mar 2023 05:01)  HR: 65 (06 Mar 2023 05:01) (63 - 77)  BP: 145/86 (06 Mar 2023 05:01) (134/80 - 158/87)  BP(mean): --  RR: 18 (06 Mar 2023 05:01) (18 - 18)  SpO2: 93% (06 Mar 2023 05:01) (93% - 95%)    Parameters below as of 06 Mar 2023 05:01  Patient On (Oxygen Delivery Method): room air        LABS:    03-06    141  |  108  |  24<H>  ----------------------------<  100<H>  3.9   |  21<L>  |  1.50<H>    Ca    8.8      06 Mar 2023 07:00          CAPILLARY BLOOD GLUCOSE          Lower Extremity Physical Exam:  Vascular: DP/PT 0/4, B/L, CFT <3 seconds B/L, Temperature gradient warm to cool, B/L.   Neuro: Epicritic sensation intact to the level of toes, B/L.  Musculoskeletal/Ortho: Unremarkable.  Skin: Right foot hallux early ischemic changes to the level of PIPJ, no edema. BL foot no open wounds, no acute signs of infection.    RADIOLOGY & ADDITIONAL TESTS:

## 2023-03-07 DIAGNOSIS — R49.0 DYSPHONIA: ICD-10-CM

## 2023-03-07 LAB
ANION GAP SERPL CALC-SCNC: 14 MMOL/L — SIGNIFICANT CHANGE UP (ref 5–17)
BUN SERPL-MCNC: 23 MG/DL — SIGNIFICANT CHANGE UP (ref 7–23)
CALCIUM SERPL-MCNC: 8.4 MG/DL — SIGNIFICANT CHANGE UP (ref 8.4–10.5)
CHLORIDE SERPL-SCNC: 108 MMOL/L — SIGNIFICANT CHANGE UP (ref 96–108)
CO2 SERPL-SCNC: 19 MMOL/L — LOW (ref 22–31)
CREAT SERPL-MCNC: 1.46 MG/DL — HIGH (ref 0.5–1.3)
EGFR: 50 ML/MIN/1.73M2 — LOW
GLUCOSE SERPL-MCNC: 159 MG/DL — HIGH (ref 70–99)
POTASSIUM SERPL-MCNC: 3.8 MMOL/L — SIGNIFICANT CHANGE UP (ref 3.5–5.3)
POTASSIUM SERPL-SCNC: 3.8 MMOL/L — SIGNIFICANT CHANGE UP (ref 3.5–5.3)
SODIUM SERPL-SCNC: 141 MMOL/L — SIGNIFICANT CHANGE UP (ref 135–145)

## 2023-03-07 PROCEDURE — 99222 1ST HOSP IP/OBS MODERATE 55: CPT | Mod: FS,25

## 2023-03-07 PROCEDURE — 31575 DIAGNOSTIC LARYNGOSCOPY: CPT

## 2023-03-07 RX ADMIN — ATORVASTATIN CALCIUM 80 MILLIGRAM(S): 80 TABLET, FILM COATED ORAL at 21:52

## 2023-03-07 RX ADMIN — Medication 1 PATCH: at 11:25

## 2023-03-07 RX ADMIN — Medication 1 PATCH: at 05:47

## 2023-03-07 RX ADMIN — Medication 81 MILLIGRAM(S): at 11:24

## 2023-03-07 RX ADMIN — ENOXAPARIN SODIUM 40 MILLIGRAM(S): 100 INJECTION SUBCUTANEOUS at 17:24

## 2023-03-07 NOTE — CONSULT NOTE ADULT - SUBJECTIVE AND OBJECTIVE BOX
CC: Upper airway eval    HPI: 77Y M, pmhx HTN, HLD, neuropathy, pt c/o right toe pain with discoloration for 1 month. Pt states he noted pain and slight discoloration of toe a month ago which has been worsening. has seen PCP, podiatrist, was told to see a vascular doctor,  pain and discoloration has increased, saw pcp yesterday and was told to go to ED. had negative x-rays at outpatient clinic as per patient. Pt presents to ED with 5/10 pain, 8/10 when toe is palpated  	pt denies any fever, cp, palpitations, sob, abdominal pain, v/d, dysuria, numbness falls or trauma or wounds. ENT was consulted for upper airway eval. 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 Pt last saw his private ENT 5years ago and was told that he was clear. Pt admits hoarseness, but denies SOB, dysphagia, cough, fevers.          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-- to have repeat biopsy on 5-11-15      Snoring  JONATHAN precautions -- responds affirmatively to STOP BANG questionnaire -- admits to loud snoring; age &gt; 50; gender, male      BPH (benign prostatic hypertrophy)      Lumbar disc disease      Thyroid nodule  findings on xray -- subsequent sonogram and biopsy (5 years ago)      Umbilical hernia      S/P tonsillectomy  Age 10      History of colonoscopy  2014      Laryngeal mass  biopsy with &quot;pre-cancerous lesion&quot; -- to have repeat biopsy 5-11-15      Thyroid nodule  sonogram and biopsy (5 years ago)        Allergies    No Known Allergies    Intolerances      MEDICATIONS  (STANDING):  aspirin enteric coated 81 milliGRAM(s) Oral daily  atorvastatin 80 milliGRAM(s) Oral at bedtime  enoxaparin Injectable 40 milliGRAM(s) SubCutaneous every 24 hours  nicotine - 21 mG/24Hr(s) Patch 1 Patch Transdermal daily  sodium chloride 0.9%. 1000 milliLiter(s) (50 mL/Hr) IV Continuous <Continuous>    MEDICATIONS  (PRN):  acetaminophen     Tablet .. 650 milliGRAM(s) Oral every 6 hours PRN Temp greater or equal to 38.5C (101.3F), Mild Pain (1 - 3)      Social History: +tobacco use    Family history: no pertinent FHx    ROS:   ENT: all negative except as noted in HPI   CV: denies palpitations  Pulm: denies SOB, cough, hemoptysis  GI: denies change in apetite, indigestion, n/v  : denies pertinent urinary symptoms, urgency  Neuro: denies numbness/tingling, loss of sensation  Psych: denies anxiety  MS: denies muscle weakness, instability  Heme: denies easy bruising or bleeding  Endo: denies heat/cold intolerance, excessive sweating  Vascular: denies LE edema    Vital Signs Last 24 Hrs  T(C): 36.3 (07 Mar 2023 11:00), Max: 36.8 (06 Mar 2023 21:16)  T(F): 97.4 (07 Mar 2023 11:00), Max: 98.3 (06 Mar 2023 21:16)  HR: 69 (07 Mar 2023 11:00) (61 - 71)  BP: 141/83 (07 Mar 2023 11:00) (134/78 - 141/83)  BP(mean): --  RR: 18 (07 Mar 2023 11:00) (18 - 18)  SpO2: 94% (07 Mar 2023 11:00) (92% - 94%)    Parameters below as of 07 Mar 2023 11:00  Patient On (Oxygen Delivery Method): room air         03-07    141  |  108  |  23  ----------------------------<  159<H>  3.8   |  19<L>  |  1.46<H>    Ca    8.4      07 Mar 2023 06:59         PHYSICAL EXAM:  Gen: NAD  Skin: No rashes, bruises, or lesions  Head: Normocephalic, Atraumatic  Face: no edema, erythema, or fluctuance. Parotid glands soft without mass  Eyes: no scleral injection  Nose: Nares bilaterally patent, no discharge  Mouth: Surgically absent tonsils, No Stridor / Drooling / Trismus.  Mucosa moist, tongue/uvula midline, oropharynx clear  Neck: Flat, supple, no lymphadenopathy, trachea midline, no masses  Lymphatic: No lymphadenopathy  Resp: breathing easily, no stridor  CV: no peripheral edema/cyanosis  GI: nondistended   Peripheral vascular: no JVD or edema  Neuro: facial nerve intact, no facial droop      Fiberoptic Indirect laryngoscopy:  (Scope #2 used)    Reason for Laryngoscopy:    Patient was unable to cooperate with mirror.  Retroflexed epiglottis, radiation changes, airway widely patent, Nasopharynx, oropharynx, and hypopharynx clear, no bleeding. Tongue base, posterior pharyngeal wall, vallecula and subglottis appear normal. No erythema, edema, pooling of secretions, masses or lesions. Airway patent, no foreign body visualized. No glottic/supraglottic edema. True vocal cords, arytenoids, vestibular folds, ventricles, pyriform sinuses, and aryepiglottic folds appear normal bilaterally. Vocal cords mobile with good contact b/l.           CC: Upper airway eval    HPI: 77Y M, pmhx HTN, HLD, neuropathy, pt c/o right toe pain with discoloration for 1 month. Pt states he noted pain and slight discoloration of toe a month ago which has been worsening. has seen PCP, podiatrist, was told to see a vascular doctor,  pain and discoloration has increased, saw pcp yesterday and was told to go to ED. had negative x-rays at outpatient clinic as per patient. Pt presents to ED with 5/10 pain, 8/10 when toe is palpated  	pt denies any fever, cp, palpitations, sob, abdominal pain, v/d, dysuria, numbness falls or trauma or wounds. ENT was consulted for upper airway eval. 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 Pt last saw his private ENT 5years ago and was told that he was clear. Pt admits hoarseness, but denies SOB, dysphagia, cough, fevers.          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-- to have repeat biopsy on 5-11-15      Snoring  JONATHAN precautions -- responds affirmatively to STOP BANG questionnaire -- admits to loud snoring; age &gt; 52; gender, male      BPH (benign prostatic hypertrophy)      Lumbar disc disease      Thyroid nodule  findings on xray -- subsequent sonogram and biopsy (5 years ago)      Umbilical hernia      S/P tonsillectomy  Age 10      History of colonoscopy  2014      Laryngeal mass  biopsy with &quot;pre-cancerous lesion&quot; -- to have repeat biopsy 5-11-15      Thyroid nodule  sonogram and biopsy (5 years ago)        Allergies    No Known Allergies    Intolerances      MEDICATIONS  (STANDING):  aspirin enteric coated 81 milliGRAM(s) Oral daily  atorvastatin 80 milliGRAM(s) Oral at bedtime  enoxaparin Injectable 40 milliGRAM(s) SubCutaneous every 24 hours  nicotine - 21 mG/24Hr(s) Patch 1 Patch Transdermal daily  sodium chloride 0.9%. 1000 milliLiter(s) (50 mL/Hr) IV Continuous <Continuous>    MEDICATIONS  (PRN):  acetaminophen     Tablet .. 650 milliGRAM(s) Oral every 6 hours PRN Temp greater or equal to 38.5C (101.3F), Mild Pain (1 - 3)      Social History: +tobacco use    Family history: no pertinent FHx    ROS:   ENT: all negative except as noted in HPI   CV: denies palpitations  Pulm: denies SOB, cough, hemoptysis  GI: denies change in apetite, indigestion, n/v  : denies pertinent urinary symptoms, urgency  Neuro: denies numbness/tingling, loss of sensation  Psych: denies anxiety  MS: denies muscle weakness, instability  Heme: denies easy bruising or bleeding  Endo: denies heat/cold intolerance, excessive sweating  Vascular: denies LE edema    Vital Signs Last 24 Hrs  T(C): 36.3 (07 Mar 2023 11:00), Max: 36.8 (06 Mar 2023 21:16)  T(F): 97.4 (07 Mar 2023 11:00), Max: 98.3 (06 Mar 2023 21:16)  HR: 69 (07 Mar 2023 11:00) (61 - 71)  BP: 141/83 (07 Mar 2023 11:00) (134/78 - 141/83)  BP(mean): --  RR: 18 (07 Mar 2023 11:00) (18 - 18)  SpO2: 94% (07 Mar 2023 11:00) (92% - 94%)    Parameters below as of 07 Mar 2023 11:00  Patient On (Oxygen Delivery Method): room air         03-07    141  |  108  |  23  ----------------------------<  159<H>  3.8   |  19<L>  |  1.46<H>    Ca    8.4      07 Mar 2023 06:59         PHYSICAL EXAM:  Gen: NAD  Skin: No rashes, bruises, or lesions  Head: Normocephalic, Atraumatic  Face: no edema, erythema, or fluctuance. Parotid glands soft without mass  Eyes: no scleral injection  Nose: Nares bilaterally patent, no discharge  Mouth: Surgically absent tonsils, No Stridor / Drooling / Trismus.  Mucosa moist, tongue/uvula midline, oropharynx clear  Neck: Flat, supple, no lymphadenopathy, trachea midline, no masses  Lymphatic: No lymphadenopathy  Resp: breathing easily, no stridor  CV: no peripheral edema/cyanosis  GI: nondistended   Peripheral vascular: no JVD or edema  Neuro: facial nerve intact, no facial droop      Fiberoptic Indirect laryngoscopy:  (Scope #2 used)    Reason for Laryngoscopy:    Patient was unable to cooperate with mirror.  Retroflexed epiglottis, sulcus vocalis, radiation changes, airway widely patent, Nasopharynx, oropharynx, and hypopharynx clear, no bleeding. Tongue base, posterior pharyngeal wall, vallecula and subglottis appear normal. No erythema, edema, pooling of secretions, masses or lesions. Airway patent, no foreign body visualized. No glottic/supraglottic edema. True vocal cords, arytenoids, vestibular folds, ventricles, pyriform sinuses, and aryepiglottic folds appear normal bilaterally. Vocal cords mobile with good contact b/l.

## 2023-03-07 NOTE — CONSULT NOTE ADULT - ASSESSMENT
77Y M, pmhx HTN, HLD, neuropathy, pt c/o right toe pain with discoloration for 1 month. ENT was consulted for upper airway eval. Pt with H/O laryngeal Ca Dx 15years ago and treated with radiation. Pt was seen by private ENT 5years ago and was told that he was clear. Bedside indirect laryngoscopy revealed radiation changes and retroflex epiglottis. Airway widely patent. There is no contraindication to intubation from an ENT standpoint. 77Y M, pmhx HTN, HLD, neuropathy, pt c/o right toe pain with discoloration for 1 month. ENT was consulted for upper airway eval. Pt with H/O laryngeal Ca Dx 15years ago and treated with radiation. Pt was seen by private ENT 5years ago and was told that he was clear. Bedside indirect laryngoscopy revealed radiation changes, sulcus vocalis, and retroflex epiglottis. Airway widely patent. There is no contraindication to intubation from an ENT standpoint.

## 2023-03-07 NOTE — CONSULT NOTE ADULT - NS ATTEND AMEND GEN_ALL_CORE FT
ENT consulted for upper airway eval. Pt with H/O laryngeal Ca Dx 2008 and treated with radiation. Pt was seen by Dr. Tremaine Alvarenga ENT 2018 and was told that he was clear.     Bedside indirect laryngoscopy revealed radiation changes and retroflex epiglottis. Airway widely patent.     Recommend  -No contraindication for upper airway intubation from an ENT standpoint.    Call ENT as needed ENT consulted for upper airway eval. Pt with H/O laryngeal Ca Dx 2008 and treated with radiation. Pt was seen by Dr. Tremaine Alvarenga ENT 2018 and was told that he was clear.     Bedside indirect laryngoscopy revealed radiation changes, sulcus vocalis, and retroflex epiglottis. Airway widely patent.     Recommend  -No contraindication for upper airway intubation from an ENT standpoint.    Call ENT as needed

## 2023-03-07 NOTE — CONSULT NOTE ADULT - PROBLEM SELECTOR RECOMMENDATION 9
The is no contraindication to intubation  Call ENT with questions -No contraindication for upper airway intubation from an ENT standpoint.    Call ENT as needed

## 2023-03-07 NOTE — PROGRESS NOTE ADULT - SUBJECTIVE AND OBJECTIVE BOX
Patient is a 77y old  Male who presents with a chief complaint of R TOe (04 Mar 2023 14:50)      SUBJECTIVE / OVERNIGHT EVENTS:  Review of Systems  chest pain no  palpitations no  sob no  nausea no  headache no    MEDICATIONS  (STANDING):  aspirin enteric coated 81 milliGRAM(s) Oral daily  atorvastatin 80 milliGRAM(s) Oral at bedtime  enoxaparin Injectable 40 milliGRAM(s) SubCutaneous every 24 hours  nicotine - 21 mG/24Hr(s) Patch 1 Patch Transdermal daily  sodium chloride 0.9%. 1000 milliLiter(s) (50 mL/Hr) IV Continuous <Continuous>    MEDICATIONS  (PRN):  acetaminophen     Tablet .. 650 milliGRAM(s) Oral every 6 hours PRN Temp greater or equal to 38.5C (101.3F), Mild Pain (1 - 3)      Vital Signs Last 24 Hrs  T(C): 36.3 (07 Mar 2023 11:00), Max: 36.8 (06 Mar 2023 21:16)  T(F): 97.4 (07 Mar 2023 11:00), Max: 98.3 (06 Mar 2023 21:16)  HR: 69 (07 Mar 2023 11:00) (61 - 71)  BP: 141/83 (07 Mar 2023 11:00) (134/78 - 141/83)  BP(mean): --  RR: 18 (07 Mar 2023 11:00) (18 - 18)  SpO2: 94% (07 Mar 2023 11:00) (92% - 94%)    Parameters below as of 07 Mar 2023 11:00  Patient On (Oxygen Delivery Method): room air        PHYSICAL EXAM:  GENERAL: NAD, well-developed  HEAD:  Atraumatic, Normocephalic  EYES: EOMI, PERRLA, conjunctiva and sclera clear  NECK: Supple, No JVD  CHEST/LUNG: Clear to auscultation bilaterally; No wheeze  HEART: Regular rate and rhythm; No murmurs, rubs, or gallops  ABDOMEN: Soft, Nontender, Nondistended; Bowel sounds present  EXTREMITIES: R toe cyanotic  PSYCH: AAOx3  NEUROLOGY: non-focal  SKIN: No rashes or lesions    LABS:        141  |  108  |  23  ----------------------------<  159<H>  3.8   |  19<L>  |  1.46<H>    Ca    8.4      07 Mar 2023 06:59            Urinalysis Basic - ( 06 Mar 2023 16:25 )    Color: Light Yellow / Appearance: Clear / S.021 / pH: x  Gluc: x / Ketone: Negative  / Bili: Negative / Urobili: Negative   Blood: x / Protein: Trace / Nitrite: Negative   Leuk Esterase: Negative / RBC: 1 /hpf / WBC 1 /HPF   Sq Epi: x / Non Sq Epi: 1 /hpf / Bacteria: Negative          RADIOLOGY & ADDITIONAL TESTS:    Imaging Personally Reviewed:    Consultant(s) Notes Reviewed:      Care Discussed with Consultants/Other Providers:

## 2023-03-07 NOTE — PROGRESS NOTE ADULT - SUBJECTIVE AND OBJECTIVE BOX
[facility]   [phone]  RENAL FOLLOW UP NOTE  --------------------------------------------------------------------------------  Chief Complaint:    24 hour events/subjective:    patient seen and examined.   denies sob    PAST HISTORY  --------------------------------------------------------------------------------  No significant changes to PMH, PSH, FHx, SHx, unless otherwise noted    ALLERGIES & MEDICATIONS  --------------------------------------------------------------------------------  Allergies    No Known Allergies    Intolerances      Standing Inpatient Medications  aspirin enteric coated 81 milliGRAM(s) Oral daily  atorvastatin 80 milliGRAM(s) Oral at bedtime  enoxaparin Injectable 40 milliGRAM(s) SubCutaneous every 24 hours  nicotine - 21 mG/24Hr(s) Patch 1 Patch Transdermal daily  sodium chloride 0.9%. 1000 milliLiter(s) IV Continuous <Continuous>    PRN Inpatient Medications  acetaminophen     Tablet .. 650 milliGRAM(s) Oral every 6 hours PRN      REVIEW OF SYSTEMS  --------------------------------------------------------------------------------    Gen: denies fevers/chills,  CVS: denies chest pain/palpitations  Resp: denies SOB/Cough  GI: Denies N/V/Abd pain  : Denies dysuria/oliguria/hematuria    VITALS/PHYSICAL EXAM  --------------------------------------------------------------------------------  T(C): 36.3 (03-07-23 @ 11:00), Max: 36.8 (03-06-23 @ 21:16)  HR: 69 (03-07-23 @ 11:00) (61 - 71)  BP: 141/83 (03-07-23 @ 11:00) (134/78 - 141/83)  RR: 18 (03-07-23 @ 11:00) (18 - 18)  SpO2: 94% (03-07-23 @ 11:00) (92% - 94%)  Wt(kg): --        03-06-23 @ 07:01  -  03-07-23 @ 07:00  --------------------------------------------------------  IN: 600 mL / OUT: 0 mL / NET: 600 mL      Physical Exam:  	  	Gen: Non toxic comfortable appearing   	Pulm: decrease bs  no rales or ronchi or wheezing  	CV: RRR, S1S2; no rub  	Abd: +BS, soft, nontender/nondistended              : ?suprapubic distention  	UE: Warm, no cyanosis  no clubbing,  no edema;  	LE: Warm, no edema decrease pulse right foot + Right big toe cyanotic     LABS/STUDIES  --------------------------------------------------------------------------------    141  |  108  |  23  ----------------------------<  159      [03-07-23 @ 06:59]  3.8   |  19  |  1.46        Ca     8.4     [03-07-23 @ 06:59]            Creatinine Trend:  SCr 1.46 [03-07 @ 06:59]  SCr 1.50 [03-06 @ 07:00]  SCr 1.41 [03-05 @ 07:15]  SCr 1.32 [03-04 @ 06:53]              Urinalysis - [03-06-23 @ 16:25]      Color Light Yellow / Appearance Clear / SG 1.021 / pH 6.0      Gluc Negative / Ketone Negative  / Bili Negative / Urobili Negative       Blood Negative / Protein Trace / Leuk Est Negative / Nitrite Negative      RBC 1 / WBC 1 / Hyaline 1 / Gran  / Sq Epi  / Non Sq Epi 1 / Bacteria Negative    Urine Creatinine 113      [03-06-23 @ 16:25]  Urine Protein 16      [03-06-23 @ 16:25]

## 2023-03-08 ENCOUNTER — TRANSCRIPTION ENCOUNTER (OUTPATIENT)
Age: 78
End: 2023-03-08

## 2023-03-08 ENCOUNTER — APPOINTMENT (OUTPATIENT)
Dept: VASCULAR SURGERY | Facility: CLINIC | Age: 78
End: 2023-03-08

## 2023-03-08 LAB
ANION GAP SERPL CALC-SCNC: 13 MMOL/L — SIGNIFICANT CHANGE UP (ref 5–17)
BUN SERPL-MCNC: 21 MG/DL — SIGNIFICANT CHANGE UP (ref 7–23)
CALCIUM SERPL-MCNC: 8.3 MG/DL — LOW (ref 8.4–10.5)
CHLORIDE SERPL-SCNC: 108 MMOL/L — SIGNIFICANT CHANGE UP (ref 96–108)
CO2 SERPL-SCNC: 20 MMOL/L — LOW (ref 22–31)
CREAT SERPL-MCNC: 1.57 MG/DL — HIGH (ref 0.5–1.3)
EGFR: 46 ML/MIN/1.73M2 — LOW
GLUCOSE SERPL-MCNC: 156 MG/DL — HIGH (ref 70–99)
POTASSIUM SERPL-MCNC: 3.8 MMOL/L — SIGNIFICANT CHANGE UP (ref 3.5–5.3)
POTASSIUM SERPL-SCNC: 3.8 MMOL/L — SIGNIFICANT CHANGE UP (ref 3.5–5.3)
SARS-COV-2 RNA SPEC QL NAA+PROBE: SIGNIFICANT CHANGE UP
SODIUM SERPL-SCNC: 141 MMOL/L — SIGNIFICANT CHANGE UP (ref 135–145)

## 2023-03-08 PROCEDURE — 93356 MYOCRD STRAIN IMG SPCKL TRCK: CPT

## 2023-03-08 PROCEDURE — 99232 SBSQ HOSP IP/OBS MODERATE 35: CPT | Mod: GC

## 2023-03-08 PROCEDURE — 93306 TTE W/DOPPLER COMPLETE: CPT | Mod: 26

## 2023-03-08 RX ADMIN — Medication 81 MILLIGRAM(S): at 12:07

## 2023-03-08 RX ADMIN — SODIUM CHLORIDE 50 MILLILITER(S): 9 INJECTION INTRAMUSCULAR; INTRAVENOUS; SUBCUTANEOUS at 12:07

## 2023-03-08 RX ADMIN — ATORVASTATIN CALCIUM 80 MILLIGRAM(S): 80 TABLET, FILM COATED ORAL at 21:42

## 2023-03-08 RX ADMIN — ENOXAPARIN SODIUM 40 MILLIGRAM(S): 100 INJECTION SUBCUTANEOUS at 17:21

## 2023-03-08 NOTE — PROGRESS NOTE ADULT - SUBJECTIVE AND OBJECTIVE BOX
TEAM VASCULAR Surgery Daily Progress Note  =====================================================    SUBJECTIVE: Patient seen and examined at bedside on AM rounds. No new complaints, pain controlled, no SOB or Chest pain.      --------------------------------------------------------------------------------------  OBJECTIVE:  Vital Signs Last 24 Hrs  T(C): 36.7 (08 Mar 2023 05:11), Max: 36.9 (07 Mar 2023 21:10)  T(F): 98 (08 Mar 2023 05:11), Max: 98.5 (07 Mar 2023 21:10)  HR: 60 (08 Mar 2023 05:11) (60 - 70)  BP: 139/64 (08 Mar 2023 05:11) (139/64 - 154/84)  BP(mean): --  RR: 18 (08 Mar 2023 05:11) (18 - 18)  SpO2: 92% (08 Mar 2023 05:11) (92% - 94%)    Parameters below as of 08 Mar 2023 05:11  Patient On (Oxygen Delivery Method): room air    I&O's Detail    07 Mar 2023 07:01  -  08 Mar 2023 07:00  --------------------------------------------------------  IN:    Oral Fluid: 275 mL    sodium chloride 0.9%: 600 mL  Total IN: 875 mL    OUT:  Total OUT: 0 mL    Total NET: 875 mL    --------------------------------------------------------------------------------------    EXAM:  General: NAD, resting in bed comfortably.  Cardiac: regular rate, warm and well perfused  Respiratory: Nonlabored respirations  Extremities: R toe with discoloration/nonblanching cyanosis. Tender to palpation  Vascular: L pop/Pedals Palpable, R pop/Pedals Non-palpable    --------------------------------------------------------------------------------------    LABS:                  --------------------------------------------------------------------------------------      MEDICATIONS:  MEDICATIONS  (STANDING):  enoxaparin Injectable 40 milliGRAM(s) SubCutaneous every 24 hours  nicotine - 21 mG/24Hr(s) Patch 1 Patch Transdermal daily  sodium chloride 0.9%. 1000 milliLiter(s) (50 mL/Hr) IV Continuous <Continuous>    MEDICATIONS  (PRN):  acetaminophen     Tablet .. 650 milliGRAM(s) Oral every 6 hours PRN Temp greater or equal to 38.5C (101.3F), Mild Pain (1 - 3)    --------------------------------------------------------------------------------------

## 2023-03-08 NOTE — PROGRESS NOTE ADULT - SUBJECTIVE AND OBJECTIVE BOX
Patient is a 77y old  Male who presents with a chief complaint of R TOe (04 Mar 2023 14:50)      SUBJECTIVE / OVERNIGHT EVENTS: Comfortable without new complaints.   Review of Systems  chest pain no  palpitations no  sob no  nausea no  headache no    MEDICATIONS  (STANDING):  aspirin enteric coated 81 milliGRAM(s) Oral daily  atorvastatin 80 milliGRAM(s) Oral at bedtime  enoxaparin Injectable 40 milliGRAM(s) SubCutaneous every 24 hours  nicotine - 21 mG/24Hr(s) Patch 1 Patch Transdermal daily  sodium chloride 0.9%. 1000 milliLiter(s) (50 mL/Hr) IV Continuous <Continuous>    MEDICATIONS  (PRN):  acetaminophen     Tablet .. 650 milliGRAM(s) Oral every 6 hours PRN Temp greater or equal to 38.5C (101.3F), Mild Pain (1 - 3)      Vital Signs Last 24 Hrs  T(C): 36.8 (08 Mar 2023 14:27), Max: 36.9 (07 Mar 2023 21:10)  T(F): 98.3 (08 Mar 2023 14:27), Max: 98.5 (07 Mar 2023 21:10)  HR: 81 (08 Mar 2023 14:27) (60 - 81)  BP: 134/72 (08 Mar 2023 14:27) (134/72 - 154/84)  BP(mean): --  RR: 18 (08 Mar 2023 14:27) (18 - 18)  SpO2: 95% (08 Mar 2023 14:27) (92% - 95%)    Parameters below as of 08 Mar 2023 14:27  Patient On (Oxygen Delivery Method): room air        PHYSICAL EXAM:  GENERAL: NAD, well-developed  HEAD:  Atraumatic, Normocephalic  EYES: EOMI, PERRLA, conjunctiva and sclera clear  NECK: Supple, No JVD  CHEST/LUNG: Clear to auscultation bilaterally; No wheeze  HEART: Regular rate and rhythm; No murmurs, rubs, or gallops  ABDOMEN: Soft, Nontender, Nondistended; Bowel sounds present  EXTREMITIES:  R toe cyanotic   PSYCH: AAOx3  NEUROLOGY: non-focal  SKIN: No rashes or lesions    LABS:    03-08    141  |  108  |  21  ----------------------------<  156<H>  3.8   |  20<L>  |  1.57<H>    Ca    8.3<L>      08 Mar 2023 07:04          < from: Transthoracic Echocardiogram (03.08.23 @ 10:30) >  Conclusions:  1. Normal mitral valve. Minimal mitral regurgitation.  2. Calcified trileaflet aortic valve with normal opening.  No aortic valve regurgitation seen.  3. Normal left ventricular systolic function. No segmental  wall motion abnormalities.  4. Global longitudinal strain (GLS) measurements performed  with HR 58 bpm, /64 mmHg. Measurements performed with  TOMTEC . Average GLS= -14.4% (reduced). Strain measurements  may be reduced due to poor endocardial tracing.  5. Normal right ventricular size and function.  *** No previous Echo exam.    < end of copied text >          RADIOLOGY & ADDITIONAL TESTS:    Imaging Personally Reviewed:    Consultant(s) Notes Reviewed:      Care Discussed with Consultants/Other Providers:

## 2023-03-08 NOTE — PROGRESS NOTE ADULT - SUBJECTIVE AND OBJECTIVE BOX
[facility]   [phone]  RENAL FOLLOW UP NOTE  --------------------------------------------------------------------------------  Chief Complaint:    24 hour events/subjective:    seen earlier   no c/o pain right leg       PAST HISTORY  --------------------------------------------------------------------------------  No significant changes to PMH, PSH, FHx, SHx, unless otherwise noted    ALLERGIES & MEDICATIONS  --------------------------------------------------------------------------------  Allergies    No Known Allergies    Intolerances      Standing Inpatient Medications  aspirin enteric coated 81 milliGRAM(s) Oral daily  atorvastatin 80 milliGRAM(s) Oral at bedtime  enoxaparin Injectable 40 milliGRAM(s) SubCutaneous every 24 hours  nicotine - 21 mG/24Hr(s) Patch 1 Patch Transdermal daily  sodium chloride 0.9%. 1000 milliLiter(s) IV Continuous <Continuous>    PRN Inpatient Medications  acetaminophen     Tablet .. 650 milliGRAM(s) Oral every 6 hours PRN      REVIEW OF SYSTEMS  --------------------------------------------------------------------------------    Gen: denies  fevers/chills,  CVS: denies chest pain/palpitations  Resp: denies SOB/Cough  GI: Denies N/V/Abd pain  : Denies dysuria or decrease urination   no pain in leg     VITALS/PHYSICAL EXAM  --------------------------------------------------------------------------------  T(C): 36.9 (03-08-23 @ 21:21), Max: 36.9 (03-08-23 @ 21:21)  HR: 69 (03-08-23 @ 21:21) (60 - 81)  BP: 153/79 (03-08-23 @ 21:21) (134/72 - 153/79)  RR: 18 (03-08-23 @ 21:21) (18 - 18)  SpO2: 95% (03-08-23 @ 21:21) (92% - 95%)  Wt(kg): --        03-07-23 @ 07:01  -  03-08-23 @ 07:00  --------------------------------------------------------  IN: 875 mL / OUT: 0 mL / NET: 875 mL      Physical Exam:  	  Gen: Non toxic comfortable appearing   	Pulm: decrease bs  no rales or ronchi or wheezing  	CV: RRR, S1S2; no rub  	Abd: +BS, soft, nontender/nondistended  	UE: Warm, no cyanosis  no clubbing,  no edema;  	LE: Warm, no edema decrease pulse right foot + Right big toe cyanotic         LABS/STUDIES  --------------------------------------------------------------------------------    141  |  108  |  21  ----------------------------<  156      [03-08-23 @ 07:04]  3.8   |  20  |  1.57        Ca     8.3     [03-08-23 @ 07:04]            Creatinine Trend:  SCr 1.57 [03-08 @ 07:04]  SCr 1.46 [03-07 @ 06:59]  SCr 1.50 [03-06 @ 07:00]  SCr 1.41 [03-05 @ 07:15]  SCr 1.32 [03-04 @ 06:53]              Urinalysis - [03-06-23 @ 16:25]      Color Light Yellow / Appearance Clear / SG 1.021 / pH 6.0      Gluc Negative / Ketone Negative  / Bili Negative / Urobili Negative       Blood Negative / Protein Trace / Leuk Est Negative / Nitrite Negative      RBC 1 / WBC 1 / Hyaline 1 / Gran  / Sq Epi  / Non Sq Epi 1 / Bacteria Negative    Urine Creatinine 113      [03-06-23 @ 16:25]  Urine Protein 16      [03-06-23 @ 16:25]

## 2023-03-08 NOTE — PROGRESS NOTE ADULT - ATTENDING COMMENTS
As above  Stable R toe isch  Plan OR zander for angio +/- intervention (likely b/l iliac stents- if ok from med/Renal.  Cons, options, procedure, risks/benefits/implcations dw'ed pt.  Informed consent obtained.

## 2023-03-09 LAB
ANION GAP SERPL CALC-SCNC: 10 MMOL/L — SIGNIFICANT CHANGE UP (ref 5–17)
APTT BLD: 27.8 SEC — SIGNIFICANT CHANGE UP (ref 27.5–35.5)
BLD GP AB SCN SERPL QL: NEGATIVE — SIGNIFICANT CHANGE UP
BUN SERPL-MCNC: 21 MG/DL — SIGNIFICANT CHANGE UP (ref 7–23)
CALCIUM SERPL-MCNC: 8.4 MG/DL — SIGNIFICANT CHANGE UP (ref 8.4–10.5)
CHLORIDE SERPL-SCNC: 110 MMOL/L — HIGH (ref 96–108)
CO2 SERPL-SCNC: 21 MMOL/L — LOW (ref 22–31)
CREAT SERPL-MCNC: 1.34 MG/DL — HIGH (ref 0.5–1.3)
EGFR: 55 ML/MIN/1.73M2 — LOW
GLUCOSE SERPL-MCNC: 117 MG/DL — HIGH (ref 70–99)
HCT VFR BLD CALC: 42.6 % — SIGNIFICANT CHANGE UP (ref 39–50)
HGB BLD-MCNC: 14.5 G/DL — SIGNIFICANT CHANGE UP (ref 13–17)
INR BLD: 0.89 RATIO — SIGNIFICANT CHANGE UP (ref 0.88–1.16)
MAGNESIUM SERPL-MCNC: 2 MG/DL — SIGNIFICANT CHANGE UP (ref 1.6–2.6)
MCHC RBC-ENTMCNC: 32.8 PG — SIGNIFICANT CHANGE UP (ref 27–34)
MCHC RBC-ENTMCNC: 34 GM/DL — SIGNIFICANT CHANGE UP (ref 32–36)
MCV RBC AUTO: 96.4 FL — SIGNIFICANT CHANGE UP (ref 80–100)
NRBC # BLD: 0 /100 WBCS — SIGNIFICANT CHANGE UP (ref 0–0)
PHOSPHATE SERPL-MCNC: 3.4 MG/DL — SIGNIFICANT CHANGE UP (ref 2.5–4.5)
PLATELET # BLD AUTO: 190 K/UL — SIGNIFICANT CHANGE UP (ref 150–400)
POTASSIUM SERPL-MCNC: 3.8 MMOL/L — SIGNIFICANT CHANGE UP (ref 3.5–5.3)
POTASSIUM SERPL-SCNC: 3.8 MMOL/L — SIGNIFICANT CHANGE UP (ref 3.5–5.3)
PROTHROM AB SERPL-ACNC: 10.2 SEC — LOW (ref 10.5–13.4)
RBC # BLD: 4.42 M/UL — SIGNIFICANT CHANGE UP (ref 4.2–5.8)
RBC # FLD: 13.3 % — SIGNIFICANT CHANGE UP (ref 10.3–14.5)
RH IG SCN BLD-IMP: POSITIVE — SIGNIFICANT CHANGE UP
SODIUM SERPL-SCNC: 141 MMOL/L — SIGNIFICANT CHANGE UP (ref 135–145)
WBC # BLD: 8.8 K/UL — SIGNIFICANT CHANGE UP (ref 3.8–10.5)
WBC # FLD AUTO: 8.8 K/UL — SIGNIFICANT CHANGE UP (ref 3.8–10.5)

## 2023-03-09 PROCEDURE — 76937 US GUIDE VASCULAR ACCESS: CPT | Mod: 26,GC

## 2023-03-09 PROCEDURE — 37221: CPT | Mod: 50,GC

## 2023-03-09 DEVICE — SHEATH INTRODUCER TERUMO PINNACLE PERIPHERAL 7FR X 10CM: Type: IMPLANTABLE DEVICE | Site: BILATERAL | Status: FUNCTIONAL

## 2023-03-09 DEVICE — CATH ANG PIGVSC 0.035IN 5FR: Type: IMPLANTABLE DEVICE | Site: BILATERAL | Status: FUNCTIONAL

## 2023-03-09 DEVICE — IMPLANTABLE DEVICE: Type: IMPLANTABLE DEVICE | Site: BILATERAL | Status: FUNCTIONAL

## 2023-03-09 DEVICE — GUIDEWIRE GLIDEWIRE ANGLED TIP 0.035" X 180CM STIFF: Type: IMPLANTABLE DEVICE | Site: BILATERAL | Status: FUNCTIONAL

## 2023-03-09 DEVICE — KIT STIFFEN MICRO INTRODUCER: Type: IMPLANTABLE DEVICE | Site: BILATERAL | Status: FUNCTIONAL

## 2023-03-09 DEVICE — CATH OMNI FLSH 0.035IN 5FRX65: Type: IMPLANTABLE DEVICE | Site: BILATERAL | Status: FUNCTIONAL

## 2023-03-09 DEVICE — INTRODUCER SET MICROPUNCTURE ACCESS 21G X 7CM: Type: IMPLANTABLE DEVICE | Site: BILATERAL | Status: FUNCTIONAL

## 2023-03-09 DEVICE — SHEATH INTRODUCER TERUMO PINNACLE PERIPHERAL 5FR X 10CM: Type: IMPLANTABLE DEVICE | Site: BILATERAL | Status: FUNCTIONAL

## 2023-03-09 DEVICE — DEVICE CLOSURE 6F/7F MYNX GRIP MUST ORDER MIN OF 10 EA: Type: IMPLANTABLE DEVICE | Site: BILATERAL | Status: FUNCTIONAL

## 2023-03-09 DEVICE — SHEATH INTRODUCER CORDIS BRITE TIP 7FR X 23CM (ORANGE): Type: IMPLANTABLE DEVICE | Site: BILATERAL | Status: FUNCTIONAL

## 2023-03-09 DEVICE — GWIRE ROSEN 0.035INX260CM: Type: IMPLANTABLE DEVICE | Site: BILATERAL | Status: FUNCTIONAL

## 2023-03-09 DEVICE — GUIDEWIRE GLIDEWIRE ANGLED TIP 0.035" X 150CM LONG TAPER: Type: IMPLANTABLE DEVICE | Site: BILATERAL | Status: FUNCTIONAL

## 2023-03-09 DEVICE — CATH ANGIO GLIDECATH ANGLE TAPER 5FR X 65CM: Type: IMPLANTABLE DEVICE | Site: BILATERAL | Status: FUNCTIONAL

## 2023-03-09 RX ORDER — ATORVASTATIN CALCIUM 80 MG/1
80 TABLET, FILM COATED ORAL AT BEDTIME
Refills: 0 | Status: DISCONTINUED | OUTPATIENT
Start: 2023-03-09 | End: 2023-03-10

## 2023-03-09 RX ORDER — ACETAMINOPHEN 500 MG
650 TABLET ORAL EVERY 6 HOURS
Refills: 0 | Status: DISCONTINUED | OUTPATIENT
Start: 2023-03-09 | End: 2023-03-10

## 2023-03-09 RX ORDER — ENOXAPARIN SODIUM 100 MG/ML
40 INJECTION SUBCUTANEOUS EVERY 24 HOURS
Refills: 0 | Status: DISCONTINUED | OUTPATIENT
Start: 2023-03-09 | End: 2023-03-10

## 2023-03-09 RX ORDER — SODIUM CHLORIDE 9 MG/ML
1000 INJECTION INTRAMUSCULAR; INTRAVENOUS; SUBCUTANEOUS
Refills: 0 | Status: DISCONTINUED | OUTPATIENT
Start: 2023-03-09 | End: 2023-03-10

## 2023-03-09 RX ORDER — ASPIRIN/CALCIUM CARB/MAGNESIUM 324 MG
81 TABLET ORAL DAILY
Refills: 0 | Status: DISCONTINUED | OUTPATIENT
Start: 2023-03-09 | End: 2023-03-10

## 2023-03-09 RX ORDER — NICOTINE POLACRILEX 2 MG
1 GUM BUCCAL DAILY
Refills: 0 | Status: DISCONTINUED | OUTPATIENT
Start: 2023-03-09 | End: 2023-03-10

## 2023-03-09 RX ADMIN — ENOXAPARIN SODIUM 40 MILLIGRAM(S): 100 INJECTION SUBCUTANEOUS at 19:17

## 2023-03-09 RX ADMIN — Medication 81 MILLIGRAM(S): at 19:17

## 2023-03-09 NOTE — PROGRESS NOTE ADULT - SUBJECTIVE AND OBJECTIVE BOX
Patient is a 77y old  Male who presents with a chief complaint of R TOe (04 Mar 2023 14:50)      SUBJECTIVE / OVERNIGHT EVENTS: Comfortable without new complaints.   Review of Systems  chest pain no  palpitations no  sob no  nausea no  headache no    MEDICATIONS  (STANDING):  aspirin enteric coated 81 milliGRAM(s) Oral daily  atorvastatin 80 milliGRAM(s) Oral at bedtime  enoxaparin Injectable 40 milliGRAM(s) SubCutaneous every 24 hours  nicotine - 21 mG/24Hr(s) Patch 1 Patch Transdermal daily  sodium chloride 0.9%. 1000 milliLiter(s) (50 mL/Hr) IV Continuous <Continuous>    MEDICATIONS  (PRN):  acetaminophen     Tablet .. 650 milliGRAM(s) Oral every 6 hours PRN Temp greater or equal to 38.5C (101.3F), Mild Pain (1 - 3)      Vital Signs Last 24 Hrs  T(C): 36.5 (09 Mar 2023 20:00), Max: 36.9 (08 Mar 2023 21:21)  T(F): 97.7 (09 Mar 2023 20:00), Max: 98.4 (08 Mar 2023 21:21)  HR: 57 (09 Mar 2023 20:00) (50 - 69)  BP: 146/82 (09 Mar 2023 20:00) (111/65 - 157/82)  BP(mean): 108 (09 Mar 2023 20:00) (83 - 110)  RR: 16 (09 Mar 2023 20:00) (14 - 18)  SpO2: 95% (09 Mar 2023 20:00) (93% - 99%)    Parameters below as of 09 Mar 2023 20:00  Patient On (Oxygen Delivery Method): room air        PHYSICAL EXAM:  GENERAL: NAD, well-developed  HEAD:  Atraumatic, Normocephalic  EYES: EOMI, PERRLA, conjunctiva and sclera clear  NECK: Supple, No JVD  CHEST/LUNG: Clear to auscultation bilaterally; No wheeze  HEART: Regular rate and rhythm; No murmurs, rubs, or gallops  ABDOMEN: Soft, Nontender, Nondistended; Bowel sounds present  EXTREMITIES:  R toe cyanotic   PSYCH: AAOx3  NEUROLOGY: non-focal  SKIN: No rashes or lesions    LABS:                        14.5   8.80  )-----------( 190      ( 09 Mar 2023 01:07 )             42.6     03-09    141  |  110<H>  |  21  ----------------------------<  117<H>  3.8   |  21<L>  |  1.34<H>    Ca    8.4      09 Mar 2023 01:07  Phos  3.4     03-09  Mg     2.0     03-09      PT/INR - ( 09 Mar 2023 01:07 )   PT: 10.2 sec;   INR: 0.89 ratio         PTT - ( 09 Mar 2023 01:07 )  PTT:27.8 sec            RADIOLOGY & ADDITIONAL TESTS:    Imaging Personally Reviewed:    Consultant(s) Notes Reviewed:      Care Discussed with Consultants/Other Providers:

## 2023-03-09 NOTE — CHART NOTE - NSCHARTNOTEFT_GEN_A_CORE
PRE-OP NOTE, Vascular Surgery    Pre-op Diagnosis: aortoiliac atherosclerotic disease   Procedure: Bilateral Lower extremity angiogram   Surgeon: Dr. Arroyo           141  |  108  |  21  ----------------------------<  156<H>  3.8   |  20<L>  |  1.57<H>    Ca    8.3<L>      08 Mar 2023 07:04        Urinalysis Basic - ( 06 Mar 2023 16:25 )    Color: Light Yellow / Appearance: Clear / S.021 / pH: x  Gluc: x / Ketone: Negative  / Bili: Negative / Urobili: Negative   Blood: x / Protein: Trace / Nitrite: Negative   Leuk Esterase: Negative / RBC: 1 /hpf / WBC 1 /HPF   Sq Epi: x / Non Sq Epi: 1 /hpf / Bacteria: Negative      CAPILLARY BLOOD GLUCOSE      A/P: 77y Male pre-op for above procedure  -NPO past midnight  -2U PRBC on hold  -IVF past midnight per primary   -consent: in the chart  -1AM labs CBC, BMP, Mg, Phos, Coags Type and screen  - Maintain Active covid     (p) 1142
Post Operative Check    Patient is post op from a LE angiogram with Insertion of R iliac stent via endovascular, femoral approach.  patient now recovering well in PACU.  Feels minimal pain save for slight discomfort in bilateral groins likely secondary to angiogram entry and post operative pressure.  Patient otherwise feels well and states he feels his feet are less symptomatic than prior.  Patient denying any nausea or vomiting and currently has a blandon with adequate urine output         Vitals    T(C): 36 (03-09-23 @ 19:30), Max: 36.9 (03-08-23 @ 21:21)  HR: 56 (03-09-23 @ 19:30) (50 - 69)  BP: 151/80 (03-09-23 @ 19:30) (111/65 - 157/82)  RR: 15 (03-09-23 @ 19:30) (14 - 18)  SpO2: 97% (03-09-23 @ 19:30) (93% - 99%)      03-08 @ 07:01  -  03-09 @ 07:00  --------------------------------------------------------  IN:    Oral Fluid: 150 mL    sodium chloride 0.9%: 600 mL  Total IN: 750 mL    OUT:  Total OUT: 0 mL    Total NET: 750 mL      03-09 @ 07:01  -  03-09 @ 20:16  --------------------------------------------------------  IN:    Oral Fluid: 10 mL    sodium chloride 0.9%: 250 mL  Total IN: 260 mL    OUT:    Indwelling Catheter - Urethral (mL): 625 mL  Total OUT: 625 mL    Total NET: -365 mL          Labs                        14.5   8.80  )-----------( 190      ( 09 Mar 2023 01:07 )             42.6       CBC Full  -  ( 09 Mar 2023 01:07 )  WBC Count : 8.80 K/uL  Hemoglobin : 14.5 g/dL  Hematocrit : 42.6 %  Platelet Count - Automated : 190 K/uL  Mean Cell Volume : 96.4 fl  Mean Cell Hemoglobin : 32.8 pg  Mean Cell Hemoglobin Concentration : 34.0 gm/dL  Auto Neutrophil # : x  Auto Lymphocyte # : x  Auto Monocyte # : x  Auto Eosinophil # : x  Auto Basophil # : x  Auto Neutrophil % : x  Auto Lymphocyte % : x  Auto Monocyte % : x  Auto Eosinophil % : x  Auto Basophil % : x      Physical Exam  General: NAD; laying flat in bed; comfortable;   Resp: nonlabored; IS at bedside   Abdomen: soft, non tender, non distended  Groins: b/l groin sites c/d/i;  no evidence of swelling/hematoma/active extravasation  Vascular: palpable DP pulses bilaterally      Patient is a 77y old Male s/p LE angiogram with Insertion of R iliac stent via endovascular, femoral approach    PLAN:  - Pain control  - Flat for 6 hours in PACU before back to floor  - Reg diet ( when upright)   - Blandon output monitoring  - Care per primary team

## 2023-03-09 NOTE — PRE-ANESTHESIA EVALUATION ADULT - NSANTHPEFT_GEN_ALL_CORE
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

## 2023-03-09 NOTE — PROGRESS NOTE ADULT - SUBJECTIVE AND OBJECTIVE BOX
Patient is a 77y old  Male who presents with a chief complaint of R TOe (04 Mar 2023 14:50)       INTERVAL HPI/OVERNIGHT EVENTS:  Patient seen and evaluated at bedside.  Pt is resting comfortable in NAD. Denies N/V/F/C.  Pain rated at X/10    Allergies    No Known Allergies    Intolerances        Vital Signs Last 24 Hrs  T(C): 36 (09 Mar 2023 16:25), Max: 36.9 (08 Mar 2023 21:21)  T(F): 96.8 (09 Mar 2023 16:25), Max: 98.4 (08 Mar 2023 21:21)  HR: 52 (09 Mar 2023 17:45) (50 - 69)  BP: 145/79 (09 Mar 2023 17:45) (111/65 - 157/82)  BP(mean): 105 (09 Mar 2023 17:45) (83 - 110)  RR: 18 (09 Mar 2023 17:45) (14 - 18)  SpO2: 96% (09 Mar 2023 17:45) (93% - 99%)    Parameters below as of 09 Mar 2023 16:25  Patient On (Oxygen Delivery Method): nasal cannula        LABS:                        14.5   8.80  )-----------( 190      ( 09 Mar 2023 01:07 )             42.6     03-09    141  |  110<H>  |  21  ----------------------------<  117<H>  3.8   |  21<L>  |  1.34<H>    Ca    8.4      09 Mar 2023 01:07  Phos  3.4     03-09  Mg     2.0     03-09      PT/INR - ( 09 Mar 2023 01:07 )   PT: 10.2 sec;   INR: 0.89 ratio         PTT - ( 09 Mar 2023 01:07 )  PTT:27.8 sec    CAPILLARY BLOOD GLUCOSE          Lower Extremity Physical Exam:  Vascular: DP/PT 0/4, B/L, CFT <3 seconds B/L, Temperature gradient warm to cool, B/L.   Neuro: Epicritic sensation intact to the level of toes, B/L.  Musculoskeletal/Ortho: Unremarkable.  Skin: Right foot hallux early ischemic changes to the level of PIPJ, no edema. BL foot no open wounds, no acute signs of infection.    RADIOLOGY & ADDITIONAL TESTS:  < from: Xray Foot AP + Lateral + Oblique, Right (03.05.23 @ 15:34) >    ACC: [id number] EXAM:  XR FOOT COMP MIN 3 VIEWS RT   ORDERED BY: DENISE LÓPEZ     PROCEDURE DATE:  03/05/2023          INTERPRETATION:  CLINICAL INDICATION: right hallux pain swelling and   infection    EXAM:  Frontal oblique lateral right foot from 3/5/2023 at 1534. No similar   prior studies available for comparison.    IMPRESSION:  No tracking soft tissue gas collections, radiopaque foreign bodies, or   gross radiographic evidence for osteomyelitis.    No fractures or dislocations.    Tarsometatarsal alignment maintained without evidence for a Lisfranc   injury.    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   Preserved remaining joint spaces and no joint margin erosions.    Small plantar calcaneal enthesophyte.    No lytic or blastic lesions.    --- End of Report ---            HAROON GUNN MD; Attending Radiologist  This document has been electronically signed. Mar  6 2023  9:59AM    < end of copied text >

## 2023-03-09 NOTE — BRIEF OPERATIVE NOTE - NSICDXBRIEFPROCEDURE_GEN_ALL_CORE_FT
PROCEDURES:  Insertion, stent, artery, iliac, endovascular, femoral approach 09-Mar-2023 15:43:03  Avery Goss

## 2023-03-09 NOTE — PROGRESS NOTE ADULT - SUBJECTIVE AND OBJECTIVE BOX
TEAM VASCULAR Surgery Daily Progress Note  =====================================================    SUBJECTIVE: Patient seen and examined at bedside on AM rounds. Patient reports that they're feeling well and just waiting for his angiogram to happen today    --------------------------------------------------------------------------------------  OBJECTIVE:    VITAL SIGNS:  Vital Signs Last 24 Hrs  T(C): 36.8 (09 Mar 2023 04:54), Max: 36.9 (08 Mar 2023 21:21)  T(F): 98.2 (09 Mar 2023 04:54), Max: 98.4 (08 Mar 2023 21:21)  HR: 60 (09 Mar 2023 04:54) (60 - 81)  BP: 144/82 (09 Mar 2023 04:54) (134/72 - 153/79)  BP(mean): --  RR: 18 (09 Mar 2023 04:54) (18 - 18)  SpO2: 93% (09 Mar 2023 04:54) (93% - 95%)    Parameters below as of 09 Mar 2023 04:54  Patient On (Oxygen Delivery Method): room air      --------------------------------------------------------------------------------------    EXAM:  General: NAD, resting in bed comfortably.  Cardiac: regular rate, warm and well perfused  Respiratory: Nonlabored respirations  Extremities: R toe with discoloration/nonblanching cyanosis. Tender to palpation  Vascular: L pop/Pedals Palpable, R pop/Pedals Non-palpable    --------------------------------------------------------------------------------------    LABS:                        14.5   8.80  )-----------( 190      ( 09 Mar 2023 01:07 )             42.6     03-09    141  |  110<H>  |  21  ----------------------------<  117<H>  3.8   |  21<L>  |  1.34<H>    Ca    8.4      09 Mar 2023 01:07  Phos  3.4     03-09  Mg     2.0     03-09      PT/INR - ( 09 Mar 2023 01:07 )   PT: 10.2 sec;   INR: 0.89 ratio         PTT - ( 09 Mar 2023 01:07 )  PTT:27.8 sec      --------------------------------------------------------------------------------------    INS AND OUTS:    03-08-23 @ 07:01  -  03-09-23 @ 07:00  --------------------------------------------------------  IN: 750 mL / OUT: 0 mL / NET: 750 mL        --------------------------------------------------------------------------------------    MEDICATIONS:  MEDICATIONS  (STANDING):  aspirin enteric coated 81 milliGRAM(s) Oral daily  atorvastatin 80 milliGRAM(s) Oral at bedtime  enoxaparin Injectable 40 milliGRAM(s) SubCutaneous every 24 hours  nicotine - 21 mG/24Hr(s) Patch 1 Patch Transdermal daily  sodium chloride 0.9%. 1000 milliLiter(s) (50 mL/Hr) IV Continuous <Continuous>    MEDICATIONS  (PRN):  acetaminophen     Tablet .. 650 milliGRAM(s) Oral every 6 hours PRN Temp greater or equal to 38.5C (101.3F), Mild Pain (1 - 3)    --------------------------------------------------------------------------------------

## 2023-03-10 ENCOUNTER — TRANSCRIPTION ENCOUNTER (OUTPATIENT)
Age: 78
End: 2023-03-10

## 2023-03-10 VITALS
RESPIRATION RATE: 18 BRPM | TEMPERATURE: 98 F | OXYGEN SATURATION: 96 % | HEART RATE: 72 BPM | SYSTOLIC BLOOD PRESSURE: 138 MMHG | DIASTOLIC BLOOD PRESSURE: 80 MMHG

## 2023-03-10 LAB
ANION GAP SERPL CALC-SCNC: 12 MMOL/L — SIGNIFICANT CHANGE UP (ref 5–17)
BUN SERPL-MCNC: 17 MG/DL — SIGNIFICANT CHANGE UP (ref 7–23)
CALCIUM SERPL-MCNC: 8.5 MG/DL — SIGNIFICANT CHANGE UP (ref 8.4–10.5)
CHLORIDE SERPL-SCNC: 108 MMOL/L — SIGNIFICANT CHANGE UP (ref 96–108)
CO2 SERPL-SCNC: 21 MMOL/L — LOW (ref 22–31)
CREAT SERPL-MCNC: 1.32 MG/DL — HIGH (ref 0.5–1.3)
EGFR: 56 ML/MIN/1.73M2 — LOW
GLUCOSE SERPL-MCNC: 101 MG/DL — HIGH (ref 70–99)
POTASSIUM SERPL-MCNC: 3.9 MMOL/L — SIGNIFICANT CHANGE UP (ref 3.5–5.3)
POTASSIUM SERPL-SCNC: 3.9 MMOL/L — SIGNIFICANT CHANGE UP (ref 3.5–5.3)
SODIUM SERPL-SCNC: 141 MMOL/L — SIGNIFICANT CHANGE UP (ref 135–145)

## 2023-03-10 PROCEDURE — 80053 COMPREHEN METABOLIC PANEL: CPT

## 2023-03-10 PROCEDURE — 86803 HEPATITIS C AB TEST: CPT

## 2023-03-10 PROCEDURE — 85652 RBC SED RATE AUTOMATED: CPT

## 2023-03-10 PROCEDURE — 86901 BLOOD TYPING SEROLOGIC RH(D): CPT

## 2023-03-10 PROCEDURE — 86900 BLOOD TYPING SEROLOGIC ABO: CPT

## 2023-03-10 PROCEDURE — C1769: CPT

## 2023-03-10 PROCEDURE — C1725: CPT

## 2023-03-10 PROCEDURE — 84100 ASSAY OF PHOSPHORUS: CPT

## 2023-03-10 PROCEDURE — 85025 COMPLETE CBC W/AUTO DIFF WBC: CPT

## 2023-03-10 PROCEDURE — 93925 LOWER EXTREMITY STUDY: CPT

## 2023-03-10 PROCEDURE — 93923 UPR/LXTR ART STDY 3+ LVLS: CPT

## 2023-03-10 PROCEDURE — 73630 X-RAY EXAM OF FOOT: CPT

## 2023-03-10 PROCEDURE — 81001 URINALYSIS AUTO W/SCOPE: CPT

## 2023-03-10 PROCEDURE — 93306 TTE W/DOPPLER COMPLETE: CPT

## 2023-03-10 PROCEDURE — 36415 COLL VENOUS BLD VENIPUNCTURE: CPT

## 2023-03-10 PROCEDURE — C1894: CPT

## 2023-03-10 PROCEDURE — 80048 BASIC METABOLIC PNL TOTAL CA: CPT

## 2023-03-10 PROCEDURE — 99285 EMERGENCY DEPT VISIT HI MDM: CPT | Mod: 25

## 2023-03-10 PROCEDURE — 75635 CT ANGIO ABDOMINAL ARTERIES: CPT | Mod: MA

## 2023-03-10 PROCEDURE — 86140 C-REACTIVE PROTEIN: CPT

## 2023-03-10 PROCEDURE — U0005: CPT

## 2023-03-10 PROCEDURE — 83735 ASSAY OF MAGNESIUM: CPT

## 2023-03-10 PROCEDURE — 76000 FLUOROSCOPY <1 HR PHYS/QHP: CPT

## 2023-03-10 PROCEDURE — 84156 ASSAY OF PROTEIN URINE: CPT

## 2023-03-10 PROCEDURE — C1874: CPT

## 2023-03-10 PROCEDURE — 85730 THROMBOPLASTIN TIME PARTIAL: CPT

## 2023-03-10 PROCEDURE — 93356 MYOCRD STRAIN IMG SPCKL TRCK: CPT

## 2023-03-10 PROCEDURE — 82570 ASSAY OF URINE CREATININE: CPT

## 2023-03-10 PROCEDURE — C1760: CPT

## 2023-03-10 PROCEDURE — 87635 SARS-COV-2 COVID-19 AMP PRB: CPT

## 2023-03-10 PROCEDURE — U0003: CPT

## 2023-03-10 PROCEDURE — 85027 COMPLETE CBC AUTOMATED: CPT

## 2023-03-10 PROCEDURE — 86850 RBC ANTIBODY SCREEN: CPT

## 2023-03-10 PROCEDURE — 85610 PROTHROMBIN TIME: CPT

## 2023-03-10 PROCEDURE — C1887: CPT

## 2023-03-10 RX ORDER — ASPIRIN/CALCIUM CARB/MAGNESIUM 324 MG
1 TABLET ORAL
Qty: 30 | Refills: 0
Start: 2023-03-10 | End: 2023-04-08

## 2023-03-10 RX ORDER — RIVAROXABAN 15 MG-20MG
2.5 KIT ORAL
Refills: 0 | Status: DISCONTINUED | OUTPATIENT
Start: 2023-03-10 | End: 2023-03-10

## 2023-03-10 RX ORDER — RIVAROXABAN 15 MG-20MG
1 KIT ORAL
Qty: 60 | Refills: 0
Start: 2023-03-10 | End: 2023-04-08

## 2023-03-10 RX ORDER — ACETAMINOPHEN 500 MG
2 TABLET ORAL
Qty: 0 | Refills: 0 | DISCHARGE
Start: 2023-03-10

## 2023-03-10 RX ORDER — ATORVASTATIN CALCIUM 80 MG/1
1 TABLET, FILM COATED ORAL
Qty: 30 | Refills: 0
Start: 2023-03-10 | End: 2023-04-08

## 2023-03-10 RX ORDER — NICOTINE POLACRILEX 2 MG
1 GUM BUCCAL
Qty: 0 | Refills: 0 | DISCHARGE
Start: 2023-03-10

## 2023-03-10 RX ADMIN — Medication 81 MILLIGRAM(S): at 11:25

## 2023-03-10 NOTE — DISCHARGE NOTE PROVIDER - CARE PROVIDERS DIRECT ADDRESSES
,[email or web address] ,[email or web address],[email or web address] [email or web address],DirectAddress_Unknown,[email or web address]

## 2023-03-10 NOTE — PROGRESS NOTE ADULT - SUBJECTIVE AND OBJECTIVE BOX
TEAM VASCULAR Surgery Daily Progress Note  =====================================================    SUBJECTIVE: Patient seen and examined at bedside on AM rounds. No new complaints. Says his LEONARDO fell out when getting up to go to the restroom    --------------------------------------------------------------------------------------  OBJECTIVE:    VITAL SIGNS:  Vital Signs Last 24 Hrs  T(C): 36.6 (10 Mar 2023 05:12), Max: 36.8 (09 Mar 2023 23:12)  T(F): 97.8 (10 Mar 2023 05:12), Max: 98.2 (09 Mar 2023 23:12)  HR: 71 (10 Mar 2023 05:12) (50 - 75)  BP: 142/81 (10 Mar 2023 05:12) (111/65 - 157/87)  BP(mean): 117 (09 Mar 2023 21:00) (83 - 117)  RR: 18 (10 Mar 2023 05:12) (14 - 18)  SpO2: 96% (10 Mar 2023 05:12) (94% - 99%)    Parameters below as of 10 Mar 2023 05:12  Patient On (Oxygen Delivery Method): room air      --------------------------------------------------------------------------------------    EXAM:  General: NAD, resting in chair comfortably.  Cardiac: warm and well perfused  Respiratory: Nonlabored respirations  Extremities: B/L groin sites covered with opsites. No hematomas appreciated  Vascular: Palp Dp/PT on R, Palp PT on L.    --------------------------------------------------------------------------------------    LABS:                        14.5   8.80  )-----------( 190      ( 09 Mar 2023 01:07 )             42.6     03-09    141  |  110<H>  |  21  ----------------------------<  117<H>  3.8   |  21<L>  |  1.34<H>    Ca    8.4      09 Mar 2023 01:07  Phos  3.4     03-09  Mg     2.0     03-09      PT/INR - ( 09 Mar 2023 01:07 )   PT: 10.2 sec;   INR: 0.89 ratio         PTT - ( 09 Mar 2023 01:07 )  PTT:27.8 sec      --------------------------------------------------------------------------------------    INS AND OUTS:    03-08-23 @ 07:01  -  03-09-23 @ 07:00  --------------------------------------------------------  IN: 750 mL / OUT: 0 mL / NET: 750 mL    03-09-23 @ 07:01  -  03-10-23 @ 06:37  --------------------------------------------------------  IN: 410 mL / OUT: 1050 mL / NET: -640 mL      --------------------------------------------------------------------------------------    MEDICATIONS:  MEDICATIONS  (STANDING):  aspirin enteric coated 81 milliGRAM(s) Oral daily  atorvastatin 80 milliGRAM(s) Oral at bedtime  enoxaparin Injectable 40 milliGRAM(s) SubCutaneous every 24 hours  nicotine - 21 mG/24Hr(s) Patch 1 Patch Transdermal daily  sodium chloride 0.9%. 1000 milliLiter(s) (50 mL/Hr) IV Continuous <Continuous>    MEDICATIONS  (PRN):  acetaminophen     Tablet .. 650 milliGRAM(s) Oral every 6 hours PRN Temp greater or equal to 38.5C (101.3F), Mild Pain (1 - 3)    --------------------------------------------------------------------------------------   TEAM VASCULAR Surgery Daily Progress Note  =====================================================    SUBJECTIVE: Patient seen and examined at bedside on AM rounds. No new complaints.    --------------------------------------------------------------------------------------  OBJECTIVE:    VITAL SIGNS:  Vital Signs Last 24 Hrs  T(C): 36.6 (10 Mar 2023 05:12), Max: 36.8 (09 Mar 2023 23:12)  T(F): 97.8 (10 Mar 2023 05:12), Max: 98.2 (09 Mar 2023 23:12)  HR: 71 (10 Mar 2023 05:12) (50 - 75)  BP: 142/81 (10 Mar 2023 05:12) (111/65 - 157/87)  BP(mean): 117 (09 Mar 2023 21:00) (83 - 117)  RR: 18 (10 Mar 2023 05:12) (14 - 18)  SpO2: 96% (10 Mar 2023 05:12) (94% - 99%)    Parameters below as of 10 Mar 2023 05:12  Patient On (Oxygen Delivery Method): room air      --------------------------------------------------------------------------------------    EXAM:  General: NAD, resting in chair comfortably.  Cardiac: warm and well perfused  Respiratory: Nonlabored respirations  Extremities: B/L groin sites covered with opsites. No hematomas appreciated  Vascular: Palp Dp/PT on R, Palp PT on L.    --------------------------------------------------------------------------------------    LABS:                        14.5   8.80  )-----------( 190      ( 09 Mar 2023 01:07 )             42.6     03-09    141  |  110<H>  |  21  ----------------------------<  117<H>  3.8   |  21<L>  |  1.34<H>    Ca    8.4      09 Mar 2023 01:07  Phos  3.4     03-09  Mg     2.0     03-09      PT/INR - ( 09 Mar 2023 01:07 )   PT: 10.2 sec;   INR: 0.89 ratio         PTT - ( 09 Mar 2023 01:07 )  PTT:27.8 sec      --------------------------------------------------------------------------------------    INS AND OUTS:    03-08-23 @ 07:01  -  03-09-23 @ 07:00  --------------------------------------------------------  IN: 750 mL / OUT: 0 mL / NET: 750 mL    03-09-23 @ 07:01  -  03-10-23 @ 06:37  --------------------------------------------------------  IN: 410 mL / OUT: 1050 mL / NET: -640 mL      --------------------------------------------------------------------------------------    MEDICATIONS:  MEDICATIONS  (STANDING):  aspirin enteric coated 81 milliGRAM(s) Oral daily  atorvastatin 80 milliGRAM(s) Oral at bedtime  enoxaparin Injectable 40 milliGRAM(s) SubCutaneous every 24 hours  nicotine - 21 mG/24Hr(s) Patch 1 Patch Transdermal daily  sodium chloride 0.9%. 1000 milliLiter(s) (50 mL/Hr) IV Continuous <Continuous>    MEDICATIONS  (PRN):  acetaminophen     Tablet .. 650 milliGRAM(s) Oral every 6 hours PRN Temp greater or equal to 38.5C (101.3F), Mild Pain (1 - 3)    --------------------------------------------------------------------------------------

## 2023-03-10 NOTE — DISCHARGE NOTE PROVIDER - NSDCCPCAREPLAN_GEN_ALL_CORE_FT
PRINCIPAL DISCHARGE DIAGNOSIS  Diagnosis: Ischemia of toe  Assessment and Plan of Treatment: s/p iliac stent   aspirin daily   may remove dressing zanderorofozia 3/11   follow up with Dr. Arroyo in 1-2 weeks please call to schedule an appointment   notify Dr. Arroyo if develop fever, chills, numbness/tingling down your legs, difficulty walking, feet become cool to touch   Please follow up with your regular medical doctor in 1 week, please call to schedule an appointment to discuss recent hospitalization         PRINCIPAL DISCHARGE DIAGNOSIS  Diagnosis: Ischemia of toe  Assessment and Plan of Treatment: s/p iliac stent   xarelto 2.5mg twice daily   aspirin 81mg daily   may remove dressing katelynn 3/11   follow up with Dr. Arroyo in 1-2 weeks please call to schedule an appointment   notify Dr. Arroyo if develop fever, chills, numbness/tingling down your legs, difficulty walking, feet become cool to touch   Please follow up with your regular medical doctor in 1 week, please call to schedule an appointment to discuss recent hospitalization        SECONDARY DISCHARGE DIAGNOSES  Diagnosis: CKD (chronic kidney disease)  Assessment and Plan of Treatment: follow up with nephrology Dr. Mcpherson in 1 week        PRINCIPAL DISCHARGE DIAGNOSIS  Diagnosis: Ischemia of toe  Assessment and Plan of Treatment: s/p iliac stent   xarelto 2.5mg twice daily   aspirin 81mg daily   may remove dressing katelynn 3/11   follow up with Dr. Arroyo in 1-2 weeks please call to schedule an appointment   notify Dr. Arroyo if develop fever, chills, numbness/tingling down your legs, difficulty walking, feet become cool to touch, groinds become swollen or hard   Please follow up with your regular medical doctor in 1 week, please call to schedule an appointment to discuss recent hospitalization  please follow up with podiatry Dr. Waterhouse in 1-2 weeks please call to schedule an appointment         SECONDARY DISCHARGE DIAGNOSES  Diagnosis: CKD (chronic kidney disease)  Assessment and Plan of Treatment: follow up with nephrology Dr. Mcpherson in 1 week

## 2023-03-10 NOTE — DISCHARGE NOTE NURSING/CASE MANAGEMENT/SOCIAL WORK - NSDCPEWEB_GEN_ALL_CORE
[facility] for Tobacco Control website --- [email or web address] website --- [email or web address]

## 2023-03-10 NOTE — DISCHARGE NOTE NURSING/CASE MANAGEMENT/SOCIAL WORK - NSDCPEFALRISK_GEN_ALL_CORE
For information on Fall & Injury Prevention, visit: [email or web address] OR  [email or web address] OR  https://www.cdc.gov/steadi/patient.html

## 2023-03-10 NOTE — PROGRESS NOTE ADULT - PROVIDER SPECIALTY LIST ADULT
Internal Medicine
Internal Medicine
Nephrology
Internal Medicine
Podiatry
Podiatry
Vascular Surgery
Internal Medicine
Nephrology
Nephrology
Podiatry

## 2023-03-10 NOTE — PROGRESS NOTE ADULT - ASSESSMENT
77 m with    Cyanotic toe PVD  - pain control  - CTA abdomen with runoff noted  - Vascular evaluation noted  - awaiting OR   - Podiatry evaluation noted  - Nephrology evaluation for optimization Dr Mcpherson noted  - gentle IVF  - ASA    HTN  - control    Smoking  - Nicotine patch  - cessation advised    DVT prophylaxis    Howard Gillette MD phone [phone] 
77 m with    Cyanotic toe PVD  - pain control  - CTA abdomen with runoff noted  - Vascular evaluation noted  - awaiting OR   - Podiatry evaluation noted  - Nephrology follow  - gentle IVF  - ASA    HTN  - control  - Echo noted     Hx Laryngeal Ca  - ENT evaluation noted    Smoking  - Nicotine patch  - cessation advised    DVT prophylaxis    No acute medical condition identified to postpone planned surgical intervention.     Howard Gillette MD phone [phone] 
77 yr old male presents with R toe ischemia and PAD. CT shows Occlusion of the proximal LEFT anterior tibial artery, without visualized distal reconstitution. Minimal irregular opacification of the left dorsalis pedis artery.      Recommendations  - Optimize medical management  - Would benefit from Asa/High dose statin therapy  - Cessation of smoking if applicable  - PT Eval with walk therapy  - Monitor for ischemic changes or worsening of symptoms  - Appreciate Podiatry/Nephro recs    Vascular Surgery  [id number]
77 yr old male presents with R toe ischemia and PAD. CT shows Occlusion of the proximal LEFT anterior tibial artery, without visualized distal reconstitution. Minimal irregular opacification of the left dorsalis pedis artery. S/P b/l lower extremity angiogram with b/l iliac stent placement    Plan  - Sánchez out; passed TOV  - ASA 81  - OOB  - Discharge planning      Vascular Surgery  [id number]
77M presents with right foot hallux early ischemic changes.  - Pt seen and evaluated.  - ANo infection concern currently  - Right foot hallux early ischemic changes to the level of PIPJ, no edema. BL foot no open wounds, no acute signs of infection.  -s/p vascular intervention, will follow back on     appearance to esnure no wet conversion  - Pod Plan: Monitor ischemic changes  - Will follow
77Y M, pmhx HTN, HLD, neuropathy, pt c/o right toe pain with discoloration for 1 month. Pt states he noted pain and slight discoloration of toe a month ago which has been worsening.    1- CKD III likely   2- HTN hx   3- PAD   4- hx bph    suspect baseline CKD III  creatinine steady  bladder scan x 1  hold hctz/arb  trend bp  continue NS @ 50 cc/hr  check U/A and urine protein/creatinine ratio   to have echo   vascular/podiatry follow up  strict I/O  trend creatinine and electrolytes daily  
77 m with    Cyanotic toe PVD  - pain control  - CTA abdomen with runoff noted  - Vascular evaluation noted  - Podiatry evaluation noted  - Nephrology evaluation for optimization Dr Mcpherson noted  - gentle IVF  - ASA    HTN  - control    Smoking  - Nicotine patch  - cessation advised    DVT prophylaxis    Howard Gillette MD phone [phone] 
77 m with    Cyanotic toe PVD  - pain control  - CTA abdomen with runoff noted  - Vascular evaluation noted  - R iliac stent   - Podiatry evaluation noted  - Nephrology follow  - gentle IVF  - ASA    HTN  - control  - Echo noted     Hx Laryngeal Ca  - ENT evaluation noted    Smoking  - Nicotine patch  - cessation advised    DVT prophylaxis    Howard Gillette MD phone [phone] 
77 m with    Cyanotic toe PVD  - pain control  - CTA abdomen with runoff noted  - Vascular evaluation noted  - awaiting OR   - Podiatry evaluation noted  - Nephrology follow  - gentle IVF  - ASA    HTN  - control    Hx Laryngeal Ca  - ENT evaluation for preop     Smoking  - Nicotine patch  - cessation advised    DVT prophylaxis    Howard Gillette MD phone [phone] 
77M presents with right foot hallux early ischemic changes.  - Pt seen and evaluated.  - ANo infection concern currently  - Right foot hallux early ischemic changes to the level of PIPJ, no edema. BL foot no open wounds, no acute signs of infection.  - Right Foot X-Rays: Ordered.  - JOAQUÍN/PVRs: RABI 0.76, RTBI 0.39, LABI 0.97, LTBI 0.26. RPVR decreased amplitude at right thigh/calf/ankle levels, LPVR decreased amplitude at ankle and great toe levels. 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  - Vasc planning CTA, appreciated.  - Pod Plan: Monitor ischemic changes pending right foot x-ray and vascular intervention  - Will follow
77Y M, pmhx HTN, HLD, neuropathy, pt c/o right toe pain with discoloration for 1 month. Pt states he noted pain and slight discoloration of toe a month ago which has been worsening.    1- CKD III likely   2- HTN hx   3- PAD   4- hx bph      suspect baseline CKD III  creatinine steady  check bladder scan, post void residual today  documented 300 cc on bladder scan yesterday  hold hctz/arb  trend bp  continue NS @ 50 cc/hr  to have echo   vascular/podiatry follow up  strict I/O  trend creatinine and electrolytes daily  
77Y M, pmhx HTN, HLD, neuropathy, pt c/o right toe pain with discoloration for 1 month. Pt states he noted pain and slight discoloration of toe a month ago which has been worsening.    1- CKD III likely   2- HTN hx   3- PAD   4- hx bph      suspect baseline CKD III  creatinine steady  hold hctz/arb  trend bp  continue NS @ 50 cc/hr  for LE angio. no renal contraindication to the proposed procedure. I d/w pt risks of worsening renal function   strict I/O  trend creatinine and electrolytes daily  
77 m with    Cyanotic toe PVD  - pain control  - Vascular evaluation noted  - s/p R iliac stent   - Podiatry evaluation noted  - Nephrology follow  - gentle IVF  - ASA    HTN  - control  - Echo noted     Hx Laryngeal Ca  - stable    Smoking  - Nicotine patch  - cessation advised    DVT prophylaxis    Howard Gillette MD phone [phone] 
77 yr old male presents with R toe ischemia and PAD. CT shows Occlusion of the proximal LEFT anterior tibial artery, without visualized distal reconstitution. Minimal irregular opacification of the left dorsalis pedis artery.      Recommendations  - Plan for Angiogram Today  - Preopped and consented  - Appreciate nephrology clearance for peripheral angiography in the setting of CKD and elevated SCr  - Appreciate Medical optimizations    Vascular Surgery  [id number]
77M presents with right foot hallux early ischemic changes.  - Pt seen and evaluated.  - Afebrile, no leukocytosis.  - Right foot hallux early ischemic changes to the level of PIPJ improving, no edema. BL foot no open wounds, no acute signs of infection.  - No concern for infection currently.  - s/p BL LE angiograms with BL iliac stent placement, with no wet conversion.  - Pt is stable for discharge from the podiatry standpoint.  - Follow up information listed in discharge provider note.  - Discussed with attending.
Assessment: 77 yr old male presents with R toe ischemia and PAD. CT shows Occlusion of the proximal LEFT anterior tibial artery, without visualized distal reconstitution. Minimal irregular opacification of the left dorsalis pedis artery.      Recommendations  - Optimize medical management  - Plan for Angiogram tomorrow  - Please document nephrology clearance for peripheral angiography in the setting of CKD and elevated SCr  - NPO at midnight  - 1AM labs cbc bmp mg phos coags type and screen   - Maintain active covid     Vascular Surgery  [id number]

## 2023-03-10 NOTE — DISCHARGE NOTE PROVIDER - NSDCMRMEDTOKEN_GEN_ALL_CORE_FT
cyclobenzaprine 5 mg oral tablet: 1 tab(s) orally , As Needed  otc supplements: aereds2  turmeric   acetaminophen 325 mg oral tablet: 2 tab(s) orally every 6 hours, As needed, Temp greater or equal to 38.5C (101.3F), Mild Pain (1 - 3)  aspirin 81 mg oral delayed release tablet: 1 tab(s) orally once a day  atorvastatin 80 mg oral tablet: 1 tab(s) orally once a day (at bedtime)  cyclobenzaprine 5 mg oral tablet: 1 tab(s) orally , As Needed  nicotine 21 mg/24 hr transdermal film, extended release: 1  transdermal once a day  rivaroxaban 2.5 mg oral tablet: 1 tab(s) orally 2 times a day

## 2023-03-10 NOTE — DISCHARGE NOTE NURSING/CASE MANAGEMENT/SOCIAL WORK - NSDCPEPAMP_GEN_ALL_CORE
“[facility] for Tobacco Control” pamphlet given [employer]ela[employer] Pregnancy And Lactation Text: This medication is Pregnancy Category D and is not considered safe during pregnancy.  The risk during breast feeding is also uncertain.

## 2023-03-10 NOTE — DISCHARGE NOTE NURSING/CASE MANAGEMENT/SOCIAL WORK - NSDCPEEMAIL_GEN_ALL_CORE
[facility] for Tobacco Control email [email or web address]

## 2023-03-10 NOTE — DISCHARGE NOTE PROVIDER - PROVIDER TOKENS
PROVIDER:[TOKEN:[[id number]],FOLLOWUP:[1 week]] PROVIDER:[TOKEN:[[id number]],FOLLOWUP:[1 week]],PROVIDER:[TOKEN:[[id number]],FOLLOWUP:[1 week]] PROVIDER:[TOKEN:[[id number]],FOLLOWUP:[1 week]],PROVIDER:[TOKEN:[[id number]],FOLLOWUP:[1 week]],PROVIDER:[TOKEN:[[id number]],FOLLOWUP:[1 week]]

## 2023-03-10 NOTE — DISCHARGE NOTE PROVIDER - NSDCFUADDAPPT_GEN_ALL_CORE_FT
Podiatry Discharge Instructions:  Follow up: Please follow up with Dr. Joseph Waterhouse within 1 week of discharge from the hospital, please call [phone] for appointment and discuss that you recently were seen in the hospital.  Wound Care: N/A  Weight bearing: Please weight bear as tolerated in supportive shoe gear.  Antibiotics: Please continue as instructed. Podiatry Discharge Instructions:  Follow up: Please follow up with Dr. Joseph Waterhouse within 1 week of discharge from the hospital, please call [phone] for appointment and discuss that you recently were seen in the hospital.  Wound Care: N/A  Weight bearing: Please weight bear as tolerated in supportive shoe gear.

## 2023-03-10 NOTE — PROGRESS NOTE ADULT - SUBJECTIVE AND OBJECTIVE BOX
Patient is a 77y old  Male who presents with a chief complaint of R TOe (04 Mar 2023 14:50)      SUBJECTIVE / OVERNIGHT EVENTS: Comfortable without new complaints.   Review of Systems  chest pain no  palpitations no  sob no  nausea no  headache no    MEDICATIONS  (STANDING):  aspirin enteric coated 81 milliGRAM(s) Oral daily  atorvastatin 80 milliGRAM(s) Oral at bedtime  nicotine - 21 mG/24Hr(s) Patch 1 Patch Transdermal daily  rivaroxaban 2.5 milliGRAM(s) Oral two times a day  sodium chloride 0.9%. 1000 milliLiter(s) (50 mL/Hr) IV Continuous <Continuous>    MEDICATIONS  (PRN):  acetaminophen     Tablet .. 650 milliGRAM(s) Oral every 6 hours PRN Temp greater or equal to 38.5C (101.3F), Mild Pain (1 - 3)      Vital Signs Last 24 Hrs  T(C): 36.6 (10 Mar 2023 13:51), Max: 36.8 (09 Mar 2023 23:12)  T(F): 97.8 (10 Mar 2023 13:51), Max: 98.2 (09 Mar 2023 23:12)  HR: 72 (10 Mar 2023 13:51) (56 - 78)  BP: 138/80 (10 Mar 2023 13:51) (138/80 - 157/87)  BP(mean): 117 (09 Mar 2023 21:00) (108 - 117)  RR: 18 (10 Mar 2023 13:51) (15 - 18)  SpO2: 96% (10 Mar 2023 13:51) (95% - 97%)    Parameters below as of 10 Mar 2023 13:51  Patient On (Oxygen Delivery Method): room air        PHYSICAL EXAM:  GENERAL: NAD, well-developed  HEAD:  Atraumatic, Normocephalic  EYES: EOMI, PERRLA, conjunctiva and sclera clear  NECK: Supple, No JVD  CHEST/LUNG: Clear to auscultation bilaterally; No wheeze  HEART: Regular rate and rhythm; No murmurs, rubs, or gallops  ABDOMEN: Soft, Nontender, Nondistended; Bowel sounds present  EXTREMITIES: R toe less cyanotic  PSYCH: AAOx3  NEUROLOGY: non-focal  SKIN: No rashes or lesions    LABS:                        14.5   8.80  )-----------( 190      ( 09 Mar 2023 01:07 )             42.6     03-10    141  |  108  |  17  ----------------------------<  101<H>  3.9   |  21<L>  |  1.32<H>    Ca    8.5      10 Mar 2023 07:11  Phos  3.4     03-09  Mg     2.0     03-09      PT/INR - ( 09 Mar 2023 01:07 )   PT: 10.2 sec;   INR: 0.89 ratio         PTT - ( 09 Mar 2023 01:07 )  PTT:27.8 sec            RADIOLOGY & ADDITIONAL TESTS:    Imaging Personally Reviewed:    Consultant(s) Notes Reviewed:      Care Discussed with Consultants/Other Providers:

## 2023-03-10 NOTE — DISCHARGE NOTE PROVIDER - CARE PROVIDER_API CALL
Sukhi Arroyo)  Vascular Surgery  [address], [address] [place] [address]  Phone: [phone]  Fax: [phone]  Follow Up Time: 1 week   Sukhi Arroyo)  Vascular Surgery  [address], [address] [place] [address]  Phone: [phone]  Fax: [phone]  Follow Up Time: 1 week    Nay Denton (DO)  Nephrology  [address], [address]  [place] [address]  Phone: [phone]  Fax: [phone]  Follow Up Time: 1 week   Sukhi Arroyo)  Vascular Surgery  [address] [facility], [address] [place] [address]  Phone: [phone]  Fax: [phone]  Follow Up Time: 1 week    Nay Denton (DO)  Nephrology  [address], [address]  [place] [address]  Phone: [phone]  Fax: [phone]  Follow Up Time: 1 week    Waterhouse, Joseph C (DPM)  Surgery  [facility] [place] [address]  Phone: [phone]  Fax: [phone]  Follow Up Time: 1 week

## 2023-03-10 NOTE — PROGRESS NOTE ADULT - SUBJECTIVE AND OBJECTIVE BOX
Patient is a 77y old  Male who presents with a chief complaint of R TOe (04 Mar 2023 14:50)       INTERVAL HPI/OVERNIGHT EVENTS:  Patient seen and evaluated at bedside.  Pt is resting comfortable in NAD. Denies N/V/F/C.    Allergies    No Known Allergies    Intolerances        Vital Signs Last 24 Hrs  T(C): 36.6 (10 Mar 2023 05:12), Max: 36.8 (09 Mar 2023 23:12)  T(F): 97.8 (10 Mar 2023 05:12), Max: 98.2 (09 Mar 2023 23:12)  HR: 71 (10 Mar 2023 05:12) (50 - 75)  BP: 142/81 (10 Mar 2023 05:12) (111/65 - 157/87)  BP(mean): 117 (09 Mar 2023 21:00) (83 - 117)  RR: 18 (10 Mar 2023 05:12) (14 - 18)  SpO2: 96% (10 Mar 2023 05:12) (94% - 99%)    Parameters below as of 10 Mar 2023 05:12  Patient On (Oxygen Delivery Method): room air        LABS:                        14.5   8.80  )-----------( 190      ( 09 Mar 2023 01:07 )             42.6     03-10    141  |  108  |  17  ----------------------------<  101<H>  3.9   |  21<L>  |  1.32<H>    Ca    8.5      10 Mar 2023 07:11  Phos  3.4     03-09  Mg     2.0     03-09      PT/INR - ( 09 Mar 2023 01:07 )   PT: 10.2 sec;   INR: 0.89 ratio         PTT - ( 09 Mar 2023 01:07 )  PTT:27.8 sec    CAPILLARY BLOOD GLUCOSE          Lower Extremity Physical Exam:  Vascular: DP/PT 0/4, B/L, CFT <3 seconds B/L, Temperature gradient warm to cool, B/L.   Neuro: Epicritic sensation intact to the level of toes, B/L.  Musculoskeletal/Ortho: Unremarkable.  Skin: Right foot hallux early ischemic changes to the level of PIPJ improving, no edema. BL foot no open wounds, no acute signs of infection.    RADIOLOGY & ADDITIONAL TESTS:

## 2023-03-10 NOTE — DISCHARGE NOTE PROVIDER - HOSPITAL COURSE
77Y M, pmhx HTN, HLD, neuropathy, pt c/o right toe pain with discoloration for 1 month. Pt states he noted pain and slight discoloration of toe a month ago which has been worsening. has seen PCP, podiatrist, was told to see avascular doctor,  pain and discoloration has increased, saw pcp yesterday and was told to go to ED. had negative x-rays at outpatient clinic as per patient. Pt presents to ED with 5/10 pain, 8/10 when toe is palpated  pt denies any fever, cp, palpitations, sob, abdominal pain, v/d, dysuria, numbness  falls or trauma or wounds.     CTA Occlusion of the proximal LEFT anterior tibial artery, without visualized   distal reconstitution. Minimal irregular opacification of the left   dorsalis pedis artery.      Post op did well. Pt ambulating, voiding, tolerating diet on discharge   77Y M, pmhx HTN, HLD, neuropathy, laryngeal ca pt c/o right toe pain with discoloration for 1 month. Pt states he noted pain and slight discoloration of toe a month ago which has been worsening. has seen PCP, podiatrist, was told to see avascular doctor,  pain and discoloration has increased, saw pcp yesterday and was told to go to ED. had negative x-rays at outpatient clinic as per patient. Pt presents to ED with 5/10 pain, 8/10 when toe is palpated  pt denies any fever, cp, palpitations, sob, abdominal pain, v/d, dysuria, numbness  falls or trauma or wounds.     Patient admitted to medicine     Vascular consulted rec CTA     CTA Occlusion of the proximal LEFT anterior tibial artery, without visualized   distal reconstitution. Minimal irregular opacification of the left   dorsalis pedis artery.    Will need revasc (Erasmo iliac stents) in OR once optimized    Patient seen by medicine, nephrology and ENT and cleared for OR     Taken to OR 3/9 underwent bilateral iliac stents.     Post op did well. Started on Xarelto 2.5 mg twice daily.  Pt ambulating, voiding, tolerating diet on discharge

## 2023-03-10 NOTE — DISCHARGE NOTE NURSING/CASE MANAGEMENT/SOCIAL WORK - NSDCFUADDAPPT_GEN_ALL_CORE_FT
Podiatry Discharge Instructions:  Follow up: Please follow up with Dr. Joseph Waterhouse within 1 week of discharge from the hospital, please call [phone] for appointment and discuss that you recently were seen in the hospital.  Wound Care: N/A  Weight bearing: Please weight bear as tolerated in supportive shoe gear.

## 2023-03-10 NOTE — DISCHARGE NOTE NURSING/CASE MANAGEMENT/SOCIAL WORK - PATIENT PORTAL LINK FT
You can access the FollowMyHealth Patient Portal offered by [facility] by registering at the following website: [email or web address]. By joining [employer]’s FollowMyHealth portal, you will also be able to view your health information using other applications (apps) compatible with our system.

## 2023-03-17 ENCOUNTER — APPOINTMENT (OUTPATIENT)
Dept: VASCULAR SURGERY | Facility: CLINIC | Age: 78
End: 2023-03-17
Payer: MEDICARE

## 2023-03-17 VITALS
HEART RATE: 71 BPM | WEIGHT: 250 LBS | TEMPERATURE: 97.6 F | SYSTOLIC BLOOD PRESSURE: 129 MMHG | HEIGHT: 73 IN | DIASTOLIC BLOOD PRESSURE: 81 MMHG | BODY MASS INDEX: 33.13 KG/M2

## 2023-03-17 VITALS — HEART RATE: 70 BPM | DIASTOLIC BLOOD PRESSURE: 80 MMHG | SYSTOLIC BLOOD PRESSURE: 136 MMHG

## 2023-03-17 PROCEDURE — 99024 POSTOP FOLLOW-UP VISIT: CPT

## 2023-03-17 PROCEDURE — 93880 EXTRACRANIAL BILAT STUDY: CPT

## 2023-03-17 RX ORDER — FLUOROURACIL 50 MG/G
5 CREAM TOPICAL
Qty: 40 | Refills: 0 | Status: COMPLETED | COMMUNITY
Start: 2021-03-23 | End: 2023-03-17

## 2023-03-17 RX ORDER — PREDNISONE 20 MG/1
20 TABLET ORAL TWICE DAILY
Qty: 10 | Refills: 1 | Status: COMPLETED | COMMUNITY
Start: 2022-02-15 | End: 2023-03-17

## 2023-03-17 RX ORDER — OXYCODONE AND ACETAMINOPHEN 7.5; 325 MG/1; MG/1
7.5-325 TABLET ORAL
Qty: 20 | Refills: 0 | Status: COMPLETED | COMMUNITY
Start: 2021-09-24 | End: 2023-03-17

## 2023-03-17 NOTE — ASSESSMENT
[Arterial/Venous Disease] : arterial/venous disease [Medication Management] : medication management [Foot care/Footwear] : foot care/footwear [Smoking Cessation] : smoking cessation [FreeTextEntry1] : Impression - Atherosclerosis, peripheral artery disease, iliac occlusive disease, abdominal aorta ectasia\par \par Plan\par Conservative medical management - foot care and protection, follow up with podiatry\par Continue to remain smoke free\par Continue aspirin and atorvastatin\par Prescribed Eliquis 2.5 mg BID as an alternative to Xarelto 2.5 mg BID since xarelto is too costly for the patient\par d/c Xarelto once pt starts Eliquis\par Return to office in 3 months June 2023 for aortoiliac duplex s/o bilateral iliac stents and abdominal aorta ectasia

## 2023-03-17 NOTE — DATA REVIEWED
[FreeTextEntry1] : 3/17/2023 bilateral carotid artery duplex\par bilateral carotids < 50% ICA stenosis\par bilateral antegrade vertebral arteries

## 2023-03-17 NOTE — HISTORY OF PRESENT ILLNESS
[FreeTextEntry1] : Pt presents to the office for post op visit s/p bilateral lower extremity angiogram with bilateral iliac stent placement on 3/9/2023. He was admitted to the hospital for right toe #1 ischemia for 1 month.\par \par History of PAD with chronic pain in bilateral buttock/thigh with walking, atherosclerosis, iliac occlusive disease, neuropathy, CKD, BPH, laryngeal ca, cervical and lumbar disc disease.\par \par Pt reports feeling well today. Color on right toe #1 improved. Pt experiences slight improvement with buttock/thigh pain when walking 1-2 blocks before stopping to rest. Denies leg swelling, rest pain or wounds/ulcers. \par \par Pt is taking low dose aspirin and xarelto 2.5 mg BID. Pt would like to switch to a different anticoagulant. He states Xarelto is too costly. Has not been taking his atorvastatin as recommended because it previously gave him leg cramps.\par \par Pt states he stopped smoking since hospitalization. Has not smoked since discharge. Used to smoke 1.5 to 2 ppd x 55 yrs\par \par Bilateral groin site well healed, c/d/i. Soft and nontender. Mild bruising on left groin.\par \par CTA with contrast of aorta w/ runoff reviewed from 3/4/2023\par - atherosclerosis\par - occlusion of the proximal left anterior tibial artery\par - abdominal aorta 2.9 cm\par \par CT neck reviewed from 2015\par - calcification near bilateral carotid bifurcation

## 2023-03-17 NOTE — PHYSICAL EXAM
[1+] : left 1+ [2+] : right 2+ [0] : left 0 [de-identified] : NAD [de-identified] : NC/AT [de-identified] : no respiratory distress [FreeTextEntry1] : bilateral groin c/d/i, soft and nontender\par mild bruising on left groin\par bilateral lower extremities warm and well perfused, brisk capillary refill\par right toe #1 color improving\par no wounds/ulcers

## 2023-04-14 ENCOUNTER — TRANSCRIPTION ENCOUNTER (OUTPATIENT)
Age: 78
End: 2023-04-14

## 2023-04-17 ENCOUNTER — NON-APPOINTMENT (OUTPATIENT)
Age: 78
End: 2023-04-17

## 2023-06-07 ENCOUNTER — NON-APPOINTMENT (OUTPATIENT)
Age: 78
End: 2023-06-07

## 2023-06-07 ENCOUNTER — APPOINTMENT (OUTPATIENT)
Dept: FAMILY MEDICINE | Facility: CLINIC | Age: 78
End: 2023-06-07
Payer: MEDICARE

## 2023-06-07 VITALS
WEIGHT: 250 LBS | DIASTOLIC BLOOD PRESSURE: 83 MMHG | TEMPERATURE: 97.5 F | OXYGEN SATURATION: 97 % | HEIGHT: 73 IN | BODY MASS INDEX: 33.13 KG/M2 | SYSTOLIC BLOOD PRESSURE: 139 MMHG | HEART RATE: 73 BPM

## 2023-06-07 DIAGNOSIS — N40.0 BENIGN PROSTATIC HYPERPLASIA WITHOUT LOWER URINARY TRACT SYMPMS: ICD-10-CM

## 2023-06-07 DIAGNOSIS — Z12.2 ENCOUNTER FOR SCREENING FOR MALIGNANT NEOPLASM OF RESPIRATORY ORGANS: ICD-10-CM

## 2023-06-07 PROCEDURE — 36415 COLL VENOUS BLD VENIPUNCTURE: CPT

## 2023-06-07 PROCEDURE — 99214 OFFICE O/P EST MOD 30 MIN: CPT | Mod: 25

## 2023-06-07 PROCEDURE — 93000 ELECTROCARDIOGRAM COMPLETE: CPT

## 2023-06-07 RX ORDER — ROSUVASTATIN CALCIUM 20 MG/1
20 TABLET, FILM COATED ORAL
Qty: 90 | Refills: 0 | Status: DISCONTINUED | COMMUNITY
Start: 2022-01-04 | End: 2023-06-07

## 2023-06-07 NOTE — CURRENT MEDS
[Takes medication as prescribed] : takes [None] : Patient does not have any barriers to medication adherence [Blood Thinners] : blood thinners [Muscle Relaxants] : muscle relaxants

## 2023-06-09 ENCOUNTER — NON-APPOINTMENT (OUTPATIENT)
Age: 78
End: 2023-06-09

## 2023-06-09 LAB
ALBUMIN SERPL ELPH-MCNC: 4.3 G/DL
ALP BLD-CCNC: 72 U/L
ALT SERPL-CCNC: 21 U/L
ANION GAP SERPL CALC-SCNC: 19 MMOL/L
AST SERPL-CCNC: 25 U/L
BILIRUB SERPL-MCNC: 0.3 MG/DL
BUN SERPL-MCNC: 27 MG/DL
CALCIUM SERPL-MCNC: 9.1 MG/DL
CHLORIDE SERPL-SCNC: 104 MMOL/L
CHOLEST SERPL-MCNC: 178 MG/DL
CO2 SERPL-SCNC: 18 MMOL/L
CREAT SERPL-MCNC: 1.53 MG/DL
EGFR: 47 ML/MIN/1.73M2
GLUCOSE SERPL-MCNC: 77 MG/DL
HDLC SERPL-MCNC: 25 MG/DL
LDLC SERPL CALC-MCNC: NORMAL MG/DL
NONHDLC SERPL-MCNC: 154 MG/DL
POTASSIUM SERPL-SCNC: 4.3 MMOL/L
PROT SERPL-MCNC: 7.1 G/DL
SODIUM SERPL-SCNC: 141 MMOL/L
TRIGL SERPL-MCNC: 400 MG/DL

## 2023-06-09 NOTE — RESULTS/DATA
[] : results reviewed [de-identified] : wnl [de-identified] : creat 1.58, unchanged [de-identified] : poor r wave progression, echo normal

## 2023-06-09 NOTE — ASSESSMENT
[Patient Optimized for Surgery] : Patient optimized for surgery [No Further Testing Recommended] : no further testing recommended [Modify anticoagulant treatment prior to procedure] : Modify anticoagulant treatment prior to procedure [As per surgery] : as per surgery [FreeTextEntry5] : stop eliquis times 5 days prior to procedure

## 2023-06-09 NOTE — HISTORY OF PRESENT ILLNESS
[Chronic Anticoagulation] : chronic anticoagulation [(Patient denies any chest pain, claudication, dyspnea on exertion, orthopnea, palpitations or syncope)] : Patient denies any chest pain, claudication, dyspnea on exertion, orthopnea, palpitations or syncope [Anti-Platelet Agents: _____] : Anti-Platelet Agents: [unfilled] [Anticoagulants: _____] : Anticoagulants: [unfilled] [Aortic Stenosis] : no aortic stenosis [Atrial Fibrillation] : no atrial fibrillation [Coronary Artery Disease] : no coronary artery disease [Recent Myocardial Infarction] : no recent myocardial infarction [Implantable Device/Pacemaker] : no implantable device/pacemaker [Asthma] : no asthma [COPD] : no COPD [Sleep Apnea] : no sleep apnea [Smoker] : not a smoker [Family Member] : no family member with adverse anesthesia reaction/sudden death [Self] : no previous adverse anesthesia reaction [Chronic Kidney Disease] : chronic kidney disease [Diabetes] : no diabetes [FreeTextEntry1] : Urolift [FreeTextEntry2] : 6/13/2023 [FreeTextEntry3] : MD. To Bell [FreeTextEntry4] : 79 yo M here for pre-op clearance for Urolift on 6/13 with Dr. Bell (Urology). \par PMHx: Diverticulitis, HLD, PAD c/b ischemia of the right 1st toe s/p Angiogram of the aorta and pelvis and percutaneous angioplasty and stenting of the b/l common iliac arteries on 3/29/2023.  [FreeTextEntry5] : prior smoker 30 years 1.5 packs, 45 pack year hx, quit smoking on 3/22/2023, on anti-coagulation for ileac stents

## 2023-06-09 NOTE — PHYSICAL EXAM
[Soft] : abdomen soft [Non-distended] : non-distended [No Masses] : no abdominal mass palpated [No HSM] : no HSM [Normal Bowel Sounds] : normal bowel sounds [Normal] : affect was normal and insight and judgment were intact [de-identified] : tender to LUQ

## 2023-06-10 LAB — BACTERIA UR CULT: ABNORMAL

## 2023-06-23 ENCOUNTER — APPOINTMENT (OUTPATIENT)
Dept: VASCULAR SURGERY | Facility: CLINIC | Age: 78
End: 2023-06-23
Payer: MEDICARE

## 2023-06-23 VITALS
HEIGHT: 73 IN | SYSTOLIC BLOOD PRESSURE: 135 MMHG | HEART RATE: 70 BPM | WEIGHT: 250 LBS | BODY MASS INDEX: 33.13 KG/M2 | DIASTOLIC BLOOD PRESSURE: 75 MMHG

## 2023-06-23 VITALS — TEMPERATURE: 97.5 F | DIASTOLIC BLOOD PRESSURE: 87 MMHG | SYSTOLIC BLOOD PRESSURE: 147 MMHG | HEART RATE: 69 BPM

## 2023-06-23 DIAGNOSIS — I77.1 STRICTURE OF ARTERY: ICD-10-CM

## 2023-06-23 PROCEDURE — 93978 VASCULAR STUDY: CPT

## 2023-06-23 PROCEDURE — 99214 OFFICE O/P EST MOD 30 MIN: CPT

## 2023-06-23 NOTE — ASSESSMENT
[Arterial/Venous Disease] : arterial/venous disease [Medication Management] : medication management [Foot care/Footwear] : foot care/footwear [Smoking Cessation] : smoking cessation [FreeTextEntry1] : Impression - Atherosclerosis, peripheral artery disease, iliac occlusive disease s/p bilateral iliac stents, abdominal aorta ectasia\par \par Plan\par Conservative medical management - foot care and protection, follow up with podiatry\par Encouraged smoking cessation - dur 5 min\par Continue aspirin 81 mg, atorvastatin and eliquis 2.5 mg BID\par Pt needs medical management for his urolift and cervical spine surgery. Advised pt to minimize the amount of time to hold off on his asa and eliquis. Advised to restart asa and eliquis once he is hemodynamically stable after his procedures.\par Will speak to pt's urologist Dr. To Bell (Office [phone]) regarding today's incidental finding on ultrasound\par Return to office in 6 months Dec 2023 to re-eval with aortoiliac duplex s/o bilateral iliac stents and abdominal aorta ectasia

## 2023-06-23 NOTE — HISTORY OF PRESENT ILLNESS
[FreeTextEntry1] : Pt presents to the office for post op visit s/p bilateral lower extremity angiogram with bilateral iliac stent placement on 3/9/2023. He was admitted to the hospital for right toe #1 ischemia for 1 month.\par \par History of PAD with chronic pain in bilateral buttock/thigh with walking, atherosclerosis, iliac occlusive disease, neuropathy, CKD, BPH, laryngeal ca, cervical and lumbar disc disease.\par \par Pt reports feeling well today. Color on right toe #1 improved. Pt experiences slight improvement with buttock/thigh pain when walking 1-2 blocks before stopping to rest. Denies leg swelling, rest pain or wounds/ulcers. \par \par Pt is taking low dose aspirin and xarelto 2.5 mg BID. Pt would like to switch to a different anticoagulant. He states Xarelto is too costly. Has not been taking his atorvastatin as recommended because it previously gave him leg cramps.\par \par Pt states he stopped smoking since hospitalization. Has not smoked since discharge. Used to smoke 1.5 to 2 ppd x 55 yrs\par \par Bilateral groin site well healed, c/d/i. Soft and nontender. Mild bruising on left groin.\par \par CTA with contrast of aorta w/ runoff reviewed from 3/4/2023\par - atherosclerosis\par - occlusion of the proximal left anterior tibial artery\par - abdominal aorta 2.9 cm\par \par CT neck reviewed from 2015\par - calcification near bilateral carotid bifurcation [de-identified] : 6/23/23 Pt here for 3 month follow up to evaluate bilateral iliac stents done on 3/9/23 with Dr. Arroyo. History of PAD with chronic pain in bilateral buttock/thigh with walking, atherosclerosis, iliac occlusive disease, neuropathy, CKD, BPH, laryngeal ca, cervical and lumbar disc disease.\par \par Pt does not have any complaints today. He states his bilateral buttock/thigh pain has improved slightly. Denies claudication, rest pain or tissue loss. He states he walks 1 block due to neuropathy in his legs. \par \par He is taking low dose aspirin, Eliquis 2.5 mg BID, and atorvastatin. He started smoking again. Smokes 3/4 to 1 ppd.\par \par Pt states he has an upcoming urolift procedure on July 13th and cervical spine surgery on July 28th.

## 2023-06-23 NOTE — PHYSICAL EXAM
[2+] : right 2+ [1+] : left 1+ [No Rash or Lesion] : No rash or lesion [Alert] : alert [Oriented to Person] : oriented to person [Oriented to Place] : oriented to place [Oriented to Time] : oriented to time [Calm] : calm [Ankle Swelling (On Exam)] : not present [Varicose Veins Of Lower Extremities] : not present [] : not present [de-identified] : NAD [de-identified] : NC/AT [de-identified] : no respiratory distress [FreeTextEntry1] : bilateral lower extremities warm and well perfused, brisk capillary refill\par right toe #1 color improving\par no wounds/ulcers [de-identified] : KARTHIKEYANL [de-identified] : mo cranial nerves 2-12 mo grossly intact [de-identified] : cooperative

## 2023-06-23 NOTE — DATA REVIEWED
[FreeTextEntry1] : 3/17/2023 bilateral carotid artery duplex\par bilateral carotids < 50% ICA stenosis\par bilateral antegrade vertebral arteries\par \par 6/23/2023 aortoiliac duplex\par patent bilateral TRAVON stents\par <50% prox R EIA stenosis\par mild ectasia of suprarenal aorta\par incidental finding of homogenous hypoechoic mass in bladder 4.7 cm x 5.1 cm

## 2023-07-21 ENCOUNTER — APPOINTMENT (OUTPATIENT)
Dept: FAMILY MEDICINE | Facility: CLINIC | Age: 78
End: 2023-07-21
Payer: MEDICARE

## 2023-07-21 ENCOUNTER — LABORATORY RESULT (OUTPATIENT)
Age: 78
End: 2023-07-21

## 2023-07-21 VITALS
OXYGEN SATURATION: 97 % | DIASTOLIC BLOOD PRESSURE: 80 MMHG | BODY MASS INDEX: 28.63 KG/M2 | WEIGHT: 216 LBS | HEIGHT: 73 IN | SYSTOLIC BLOOD PRESSURE: 130 MMHG | HEART RATE: 85 BPM

## 2023-07-21 DIAGNOSIS — Z01.818 ENCOUNTER FOR OTHER PREPROCEDURAL EXAMINATION: ICD-10-CM

## 2023-07-21 DIAGNOSIS — M48.07 SPINAL STENOSIS, LUMBOSACRAL REGION: ICD-10-CM

## 2023-07-21 PROCEDURE — 99214 OFFICE O/P EST MOD 30 MIN: CPT | Mod: 25

## 2023-07-21 PROCEDURE — 36415 COLL VENOUS BLD VENIPUNCTURE: CPT

## 2023-07-21 RX ORDER — APIXABAN 2.5 MG/1
2.5 TABLET, FILM COATED ORAL
Qty: 180 | Refills: 3 | Status: DISCONTINUED | COMMUNITY
Start: 2023-03-17 | End: 2023-07-21

## 2023-07-21 NOTE — HISTORY OF PRESENT ILLNESS
[COPD] : COPD [No Adverse Anesthesia Reaction] : no adverse anesthesia reaction in self or family member [Chronic Anticoagulation] : chronic anticoagulation [Chronic Kidney Disease] : chronic kidney disease [(Patient denies any chest pain, claudication, dyspnea on exertion, orthopnea, palpitations or syncope)] : Patient denies any chest pain, claudication, dyspnea on exertion, orthopnea, palpitations or syncope [Anti-Platelet Agents: _____] : Anti-Platelet Agents: [unfilled] [Anticoagulants: _____] : Anticoagulants: [unfilled] [Aortic Stenosis] : no aortic stenosis [Atrial Fibrillation] : no atrial fibrillation [Coronary Artery Disease] : no coronary artery disease [Recent Myocardial Infarction] : no recent myocardial infarction [Implantable Device/Pacemaker] : no implantable device/pacemaker [Asthma] : no asthma [Sleep Apnea] : no sleep apnea [Smoker] : not a smoker [Family Member] : no family member with adverse anesthesia reaction/sudden death [Self] : no previous adverse anesthesia reaction [Diabetes] : no diabetes [FreeTextEntry1] : cervical laminectomy  [FreeTextEntry2] : 7/28/2023 [FreeTextEntry3] : Dr. Carrington Miramontes [FreeTextEntry4] :  7/21/23: chronic neck pain\par \par PMHx: Diverticulitis, HLD, PAD c/b ischemia of the right 1st toe s/p Angiogram of the aorta and pelvis and percutaneous angioplasty and stenting of the b/l common iliac arteries on 3/29/2023.  [FreeTextEntry5] : prior smoker 30 years 1.5 packs, 45 pack year hx, quit smoking on 3/22/2023, on anti-coagulation for ileac stents

## 2023-07-21 NOTE — RESULTS/DATA
[] : results reviewed [de-identified] : wnl [de-identified] : creat 1.58, unchanged [de-identified] : poor r wave progression, echo normal

## 2023-07-21 NOTE — PHYSICAL EXAM
[Soft] : abdomen soft [Non-distended] : non-distended [No Masses] : no abdominal mass palpated [No HSM] : no HSM [Normal Bowel Sounds] : normal bowel sounds [Normal] : affect was normal and insight and judgment were intact [de-identified] : tender to LUQ

## 2023-07-26 LAB
25(OH)D3 SERPL-MCNC: 31.9 NG/ML
ALBUMIN SERPL ELPH-MCNC: 4.5 G/DL
ALP BLD-CCNC: 84 U/L
ALT SERPL-CCNC: 26 U/L
ANION GAP SERPL CALC-SCNC: 17 MMOL/L
APPEARANCE: CLEAR
AST SERPL-CCNC: 25 U/L
BACTERIA: ABNORMAL /HPF
BILIRUB SERPL-MCNC: 0.2 MG/DL
BILIRUBIN URINE: NEGATIVE
BLOOD URINE: ABNORMAL
BUN SERPL-MCNC: 24 MG/DL
CALCIUM SERPL-MCNC: 9.1 MG/DL
CAST: 3 /LPF
CHLORIDE SERPL-SCNC: 100 MMOL/L
CHOLEST SERPL-MCNC: 194 MG/DL
CO2 SERPL-SCNC: 22 MMOL/L
COLOR: NORMAL
CREAT SERPL-MCNC: 1.51 MG/DL
EGFR: 48 ML/MIN/1.73M2
EPITHELIAL CELLS: 1 /HPF
ESTIMATED AVERAGE GLUCOSE: 160 MG/DL
FOLATE SERPL-MCNC: 15.1 NG/ML
GLUCOSE QUALITATIVE U: NEGATIVE MG/DL
GLUCOSE SERPL-MCNC: 102 MG/DL
HBA1C MFR BLD HPLC: 7.2 %
HDLC SERPL-MCNC: 34 MG/DL
KETONES URINE: ABNORMAL MG/DL
LDLC SERPL CALC-MCNC: 129 MG/DL
LEUKOCYTE ESTERASE URINE: ABNORMAL
MAGNESIUM SERPL-MCNC: 2.4 MG/DL
MICROSCOPIC-UA: NORMAL
NITRITE URINE: POSITIVE
NONHDLC SERPL-MCNC: 159 MG/DL
PH URINE: 5.5
POTASSIUM SERPL-SCNC: 4.1 MMOL/L
PROT SERPL-MCNC: 7.9 G/DL
PROTEIN URINE: 100 MG/DL
RED BLOOD CELLS URINE: 1 /HPF
REVIEW: NORMAL
SODIUM SERPL-SCNC: 139 MMOL/L
SPECIFIC GRAVITY URINE: 1.02
TRIGL SERPL-MCNC: 171 MG/DL
UROBILINOGEN URINE: 0.2 MG/DL
VIT B12 SERPL-MCNC: 459 PG/ML
WBC CLUMPS: PRESENT
WHITE BLOOD CELLS URINE: 279 /HPF

## 2023-07-31 LAB
APPEARANCE: ABNORMAL
BACTERIA: ABNORMAL /HPF
BILIRUBIN URINE: NEGATIVE
BLOOD URINE: ABNORMAL
CAST: 14 /LPF
COLOR: NORMAL
EPITHELIAL CELLS: 2 /HPF
GLUCOSE QUALITATIVE U: NEGATIVE MG/DL
HYALINE CASTS: PRESENT
KETONES URINE: ABNORMAL MG/DL
LEUKOCYTE ESTERASE URINE: ABNORMAL
MICROSCOPIC-UA: NORMAL
NITRITE URINE: POSITIVE
PH URINE: 5.5
PROTEIN URINE: 100 MG/DL
RED BLOOD CELLS URINE: 2 /HPF
RENAL TUBULAR EPITHELIAL CELLS: PRESENT
REVIEW: NORMAL
SPECIFIC GRAVITY URINE: 1.02
TRANSITIONAL EPITHELIAL CELLS: PRESENT
UROBILINOGEN URINE: 1 MG/DL
WHITE BLOOD CELLS URINE: 698 /HPF

## 2023-08-02 LAB — BACTERIA UR CULT: ABNORMAL

## 2023-08-13 ENCOUNTER — APPOINTMENT (OUTPATIENT)
Dept: FAMILY MEDICINE | Facility: CLINIC | Age: 78
End: 2023-08-13
Payer: MEDICARE

## 2023-08-13 DIAGNOSIS — Z09 ENCOUNTER FOR FOLLOW-UP EXAMINATION AFTER COMPLETED TREATMENT FOR CONDITIONS OTHER THAN MALIGNANT NEOPLASM: ICD-10-CM

## 2023-08-13 DIAGNOSIS — E11.29 TYPE 2 DIABETES MELLITUS WITH OTHER DIABETIC KIDNEY COMPLICATION: ICD-10-CM

## 2023-08-13 DIAGNOSIS — N39.0 URINARY TRACT INFECTION, SITE NOT SPECIFIED: ICD-10-CM

## 2023-08-13 DIAGNOSIS — Z87.891 PERSONAL HISTORY OF NICOTINE DEPENDENCE: ICD-10-CM

## 2023-08-13 DIAGNOSIS — A41.4 SEPSIS DUE TO ANAEROBES: ICD-10-CM

## 2023-08-13 PROCEDURE — 99496 TRANSJ CARE MGMT HIGH F2F 7D: CPT

## 2023-08-14 LAB
ALBUMIN SERPL ELPH-MCNC: 4.1 G/DL
ALP BLD-CCNC: 85 U/L
ALT SERPL-CCNC: 51 U/L
ANION GAP SERPL CALC-SCNC: 12 MMOL/L
APPEARANCE: CLEAR
AST SERPL-CCNC: 34 U/L
BACTERIA UR CULT: NORMAL
BACTERIA: NEGATIVE /HPF
BILIRUB SERPL-MCNC: 0.2 MG/DL
BILIRUBIN URINE: NEGATIVE
BLOOD URINE: ABNORMAL
BUN SERPL-MCNC: 33 MG/DL
CALCIUM SERPL-MCNC: 9.2 MG/DL
CAST: 0 /LPF
CHLORIDE SERPL-SCNC: 103 MMOL/L
CO2 SERPL-SCNC: 23 MMOL/L
COLOR: ABNORMAL
CREAT SERPL-MCNC: 1.68 MG/DL
CRP SERPL-MCNC: 4 MG/L
EGFR: 42 ML/MIN/1.73M2
EPITHELIAL CELLS: 3 /HPF
ERYTHROCYTE [SEDIMENTATION RATE] IN BLOOD BY WESTERGREN METHOD: 83 MM/HR
GLUCOSE QUALITATIVE U: NEGATIVE MG/DL
GLUCOSE SERPL-MCNC: 91 MG/DL
KETONES URINE: NEGATIVE MG/DL
LEUKOCYTE ESTERASE URINE: ABNORMAL
MICROSCOPIC-UA: NORMAL
NITRITE URINE: NEGATIVE
PH URINE: 6.5
POTASSIUM SERPL-SCNC: 4.6 MMOL/L
PROT SERPL-MCNC: 7.2 G/DL
PROTEIN URINE: 100 MG/DL
RED BLOOD CELLS URINE: 70 /HPF
SODIUM SERPL-SCNC: 138 MMOL/L
SPECIFIC GRAVITY URINE: 1.01
UROBILINOGEN URINE: 0.2 MG/DL
WHITE BLOOD CELLS URINE: 60 /HPF

## 2023-08-21 DIAGNOSIS — N39.0 URINARY TRACT INFECTION, SITE NOT SPECIFIED: ICD-10-CM

## 2023-08-21 DIAGNOSIS — A41.9 SEPSIS, UNSPECIFIED ORGANISM: ICD-10-CM

## 2023-08-21 DIAGNOSIS — Z86.19 PERSONAL HISTORY OF OTHER INFECTIOUS AND PARASITIC DISEASES: ICD-10-CM

## 2023-08-21 DIAGNOSIS — N39.0 SEPSIS, UNSPECIFIED ORGANISM: ICD-10-CM

## 2023-08-22 ENCOUNTER — RX RENEWAL (OUTPATIENT)
Age: 78
End: 2023-08-22

## 2023-08-23 LAB
ALBUMIN SERPL ELPH-MCNC: 4.2 G/DL
ALP BLD-CCNC: 91 U/L
ALT SERPL-CCNC: 47 U/L
ANION GAP SERPL CALC-SCNC: 16 MMOL/L
APPEARANCE: ABNORMAL
AST SERPL-CCNC: 27 U/L
BACTERIA: ABNORMAL /HPF
BILIRUB SERPL-MCNC: 0.7 MG/DL
BILIRUBIN URINE: NEGATIVE
BLOOD URINE: ABNORMAL
BUN SERPL-MCNC: 19 MG/DL
CALCIUM SERPL-MCNC: 9.2 MG/DL
CAST: 2 /LPF
CHLORIDE SERPL-SCNC: 101 MMOL/L
CO2 SERPL-SCNC: 23 MMOL/L
COLOR: YELLOW
CREAT SERPL-MCNC: 1.41 MG/DL
EGFR: 52 ML/MIN/1.73M2
EPITHELIAL CELLS: 0 /HPF
GLUCOSE QUALITATIVE U: >=1000 MG/DL
GLUCOSE SERPL-MCNC: 118 MG/DL
KETONES URINE: NEGATIVE MG/DL
LEUKOCYTE ESTERASE URINE: ABNORMAL
MICROSCOPIC-UA: NORMAL
NITRITE URINE: POSITIVE
PH URINE: 5.5
POTASSIUM SERPL-SCNC: 4.6 MMOL/L
PROT SERPL-MCNC: 7.4 G/DL
PROTEIN URINE: 30 MG/DL
RED BLOOD CELLS URINE: 6 /HPF
REVIEW: NORMAL
SODIUM SERPL-SCNC: 140 MMOL/L
SPECIFIC GRAVITY URINE: 1.02
UROBILINOGEN URINE: 0.2 MG/DL
WHITE BLOOD CELLS URINE: 608 /HPF

## 2023-08-23 RX ORDER — BLOOD-GLUCOSE METER
W/DEVICE EACH MISCELLANEOUS
Qty: 1 | Refills: 0 | Status: ACTIVE | COMMUNITY
Start: 2023-08-23 | End: 1900-01-01

## 2023-08-23 RX ORDER — FOSFOMYCIN TROMETHAMINE 3 G/1
3 POWDER ORAL
Qty: 2 | Refills: 3 | Status: ACTIVE | COMMUNITY
Start: 2023-08-21 | End: 1900-01-01

## 2023-08-23 RX ORDER — BLOOD SUGAR DIAGNOSTIC
STRIP MISCELLANEOUS TWICE DAILY
Qty: 3 | Refills: 0 | Status: ACTIVE | COMMUNITY
Start: 2023-08-23 | End: 1900-01-01

## 2023-08-24 PROBLEM — N39.0 ACUTE UTI: Status: ACTIVE | Noted: 2023-06-10

## 2023-08-24 PROBLEM — A41.4: Status: ACTIVE | Noted: 2023-08-21

## 2023-08-24 PROBLEM — Z09 HOSPITAL DISCHARGE FOLLOW-UP: Status: ACTIVE | Noted: 2023-08-24

## 2023-08-24 PROBLEM — E11.29 TYPE 2 DIABETES MELLITUS WITH OTHER KIDNEY COMPLICATION: Status: ACTIVE | Noted: 2023-08-23

## 2023-08-24 PROBLEM — Z87.891 HISTORY OF SMOKING GREATER THAN 50 PACK YEARS: Status: ACTIVE | Noted: 2020-11-24

## 2023-08-24 NOTE — HEALTH RISK ASSESSMENT
[No] : No [No falls in past year] : Patient reported no falls in the past year [de-identified] : walking in house [de-identified] : reg [Change in mental status noted] : No change in mental status noted [Language] : denies difficulty with language [Behavior] : denies difficulty with behavior [Learning/Retaining New Information] : denies difficulty learning/retaining new information [Handling Complex Tasks] : denies difficulty handling complex tasks [Reasoning] : denies difficulty with reasoning [Spatial Ability and Orientation] : denies difficulty with spatial ability and orientation [None] : None [With Family] : lives with family [# of Members in Household ___] :  household currently consist of [unfilled] member(s) [Retired] : retired [College] : College [] :  [# Of Children ___] : has [unfilled] children [Fully functional (bathing, dressing, toileting, transferring, walking, feeding)] : Fully functional (bathing, dressing, toileting, transferring, walking, feeding) [Fully functional (using the telephone, shopping, preparing meals, housekeeping, doing laundry, using] : Fully functional and needs no help or supervision to perform IADLs (using the telephone, shopping, preparing meals, housekeeping, doing laundry, using transportation, managing medications and managing finances) [Reports changes in hearing] : Reports changes in hearing [Reports changes in vision] : Reports no changes in vision [Reports normal functional visual acuity (ie: able to read med bottle)] : Reports poor functional visual acuity.

## 2023-08-24 NOTE — REVIEW OF SYSTEMS
[Fever] : no fever [Chills] : no chills [Fatigue] : fatigue [Night Sweats] : no night sweats [Recent Change In Weight] : ~T no recent weight change [Nocturia] : nocturia [Hematuria] : hematuria [Negative] : Musculoskeletal

## 2023-08-24 NOTE — ASSESSMENT
[FreeTextEntry1] : will do follow-up ua/c and s/, cbc,  discussed low carb diet, will consider amaryl follow-up with urology, nephrology

## 2023-08-24 NOTE — HISTORY OF PRESENT ILLNESS
[Post-hospitalization from ___ Hospital] : Post-hospitalization from [unfilled] Hospital [Admitted on: ___] : The patient was admitted on [unfilled] [Discharged on ___] : discharged on [unfilled] [Discharge Summary] : discharge summary [Pertinent Labs] : pertinent labs [Radiology Findings] : radiology findings [Discharge Med List] : discharge medication list [Med Reconciliation] : medication reconciliation has been completed [Patient Contacted By: ____] : and contacted by [unfilled] [FreeTextEntry2] : Patient s/p prostate procedure for BPH, had indwelling catheter for 3 weeks post procedure. developed urosepsis, admitted for IV antibiotics

## 2023-08-25 LAB — BACTERIA UR CULT: ABNORMAL

## 2023-08-26 DIAGNOSIS — M54.9 DORSALGIA, UNSPECIFIED: ICD-10-CM

## 2023-08-26 RX ORDER — OXYCODONE AND ACETAMINOPHEN 10; 325 MG/1; MG/1
10-325 TABLET ORAL
Qty: 20 | Refills: 0 | Status: ACTIVE | COMMUNITY
Start: 2023-08-26 | End: 1900-01-01

## 2023-08-30 LAB
ALBUMIN SERPL ELPH-MCNC: 4 G/DL
ALP BLD-CCNC: 73 U/L
ALT SERPL-CCNC: 40 U/L
ANION GAP SERPL CALC-SCNC: 12 MMOL/L
APPEARANCE: CLEAR
AST SERPL-CCNC: 30 U/L
BACTERIA: NEGATIVE /HPF
BILIRUB SERPL-MCNC: 0.2 MG/DL
BILIRUBIN URINE: NEGATIVE
BLOOD URINE: ABNORMAL
BUN SERPL-MCNC: 28 MG/DL
CALCIUM SERPL-MCNC: 9.5 MG/DL
CAST: 1 /LPF
CHLORIDE SERPL-SCNC: 107 MMOL/L
CO2 SERPL-SCNC: 23 MMOL/L
COLOR: YELLOW
CREAT SERPL-MCNC: 1.39 MG/DL
EGFR: 53 ML/MIN/1.73M2
EPITHELIAL CELLS: 1 /HPF
GLUCOSE QUALITATIVE U: NEGATIVE MG/DL
GLUCOSE SERPL-MCNC: 116 MG/DL
HCT VFR BLD CALC: 40.5 %
HGB BLD-MCNC: 13.1 G/DL
KETONES URINE: NEGATIVE MG/DL
LEUKOCYTE ESTERASE URINE: ABNORMAL
MCHC RBC-ENTMCNC: 31.4 PG
MCHC RBC-ENTMCNC: 32.3 GM/DL
MCV RBC AUTO: 97.1 FL
MICROSCOPIC-UA: NORMAL
NITRITE URINE: NEGATIVE
PH URINE: 5.5
PLATELET # BLD AUTO: 273 K/UL
POTASSIUM SERPL-SCNC: 4.4 MMOL/L
PROT SERPL-MCNC: 7 G/DL
PROTEIN URINE: 30 MG/DL
RBC # BLD: 4.17 M/UL
RBC # FLD: 14.6 %
RED BLOOD CELLS URINE: 2 /HPF
REVIEW: NORMAL
SODIUM SERPL-SCNC: 142 MMOL/L
SPECIFIC GRAVITY URINE: 1.02
UROBILINOGEN URINE: 0.2 MG/DL
WBC # FLD AUTO: 9.39 K/UL
WHITE BLOOD CELLS URINE: 86 /HPF

## 2023-08-31 LAB — BACTERIA UR CULT: NORMAL

## 2023-09-29 ENCOUNTER — APPOINTMENT (OUTPATIENT)
Dept: FAMILY MEDICINE | Facility: CLINIC | Age: 78
End: 2023-09-29
Payer: MEDICARE

## 2023-09-29 ENCOUNTER — LABORATORY RESULT (OUTPATIENT)
Age: 78
End: 2023-09-29

## 2023-09-29 VITALS
OXYGEN SATURATION: 94 % | BODY MASS INDEX: 24.82 KG/M2 | SYSTOLIC BLOOD PRESSURE: 121 MMHG | TEMPERATURE: 97.9 F | HEART RATE: 75 BPM | DIASTOLIC BLOOD PRESSURE: 73 MMHG | WEIGHT: 214.56 LBS | HEIGHT: 78 IN

## 2023-09-29 DIAGNOSIS — K57.90 DIVERTICULOSIS OF INTESTINE, PART UNSPECIFIED, W/OUT PERFORATION OR ABSCESS W/OUT BLEEDING: ICD-10-CM

## 2023-09-29 PROCEDURE — 99214 OFFICE O/P EST MOD 30 MIN: CPT | Mod: 25

## 2023-09-29 PROCEDURE — 36415 COLL VENOUS BLD VENIPUNCTURE: CPT

## 2023-09-29 RX ORDER — CHLORDIAZEPOXIDE HYDROCHLORIDE AND CLIDINIUM BROMIDE 5; 2.5 MG/1; MG/1
5-2.5 CAPSULE, GELATIN COATED ORAL
Qty: 30 | Refills: 0 | Status: ACTIVE | COMMUNITY
Start: 2023-09-29 | End: 1900-01-01

## 2023-09-29 RX ORDER — AMOXICILLIN AND CLAVULANATE POTASSIUM 875; 125 MG/1; MG/1
875-125 TABLET, COATED ORAL
Qty: 14 | Refills: 0 | Status: ACTIVE | COMMUNITY
Start: 2023-09-29 | End: 1900-01-01

## 2023-09-30 LAB
ALBUMIN SERPL ELPH-MCNC: 4.3 G/DL
ALP BLD-CCNC: 74 U/L
ALT SERPL-CCNC: 20 U/L
AMYLASE/CREAT SERPL: 95 U/L
ANION GAP SERPL CALC-SCNC: 16 MMOL/L
AST SERPL-CCNC: 20 U/L
BASOPHILS # BLD AUTO: 0.11 K/UL
BASOPHILS NFR BLD AUTO: 1.3 %
BILIRUB SERPL-MCNC: 0.3 MG/DL
BUN SERPL-MCNC: 28 MG/DL
CALCIUM SERPL-MCNC: 8.9 MG/DL
CHLORIDE SERPL-SCNC: 109 MMOL/L
CO2 SERPL-SCNC: 19 MMOL/L
CREAT SERPL-MCNC: 1.35 MG/DL
EGFR: 54 ML/MIN/1.73M2
EOSINOPHIL # BLD AUTO: 0.52 K/UL
EOSINOPHIL NFR BLD AUTO: 6.2 %
GGT SERPL-CCNC: 13 U/L
GLUCOSE SERPL-MCNC: 115 MG/DL
HCT VFR BLD CALC: 44.1 %
HGB BLD-MCNC: 14.4 G/DL
IMM GRANULOCYTES NFR BLD AUTO: 0.1 %
LPL SERPL-CCNC: 93 U/L
LYMPHOCYTES # BLD AUTO: 3.07 K/UL
LYMPHOCYTES NFR BLD AUTO: 36.7 %
MAN DIFF?: NORMAL
MCHC RBC-ENTMCNC: 32.4 PG
MCHC RBC-ENTMCNC: 32.7 GM/DL
MCV RBC AUTO: 99.1 FL
MONOCYTES # BLD AUTO: 0.6 K/UL
MONOCYTES NFR BLD AUTO: 7.2 %
NEUTROPHILS # BLD AUTO: 4.05 K/UL
NEUTROPHILS NFR BLD AUTO: 48.5 %
PLATELET # BLD AUTO: 201 K/UL
POTASSIUM SERPL-SCNC: 4.3 MMOL/L
PROT SERPL-MCNC: 7.3 G/DL
RBC # BLD: 4.45 M/UL
RBC # FLD: 15.9 %
SODIUM SERPL-SCNC: 143 MMOL/L
WBC # FLD AUTO: 8.36 K/UL

## 2023-11-17 ENCOUNTER — APPOINTMENT (OUTPATIENT)
Dept: VASCULAR SURGERY | Facility: CLINIC | Age: 78
End: 2023-11-17
Payer: MEDICARE

## 2023-11-17 VITALS — HEART RATE: 72 BPM | SYSTOLIC BLOOD PRESSURE: 142 MMHG | DIASTOLIC BLOOD PRESSURE: 79 MMHG

## 2023-11-17 VITALS
DIASTOLIC BLOOD PRESSURE: 84 MMHG | WEIGHT: 214 LBS | HEART RATE: 72 BPM | BODY MASS INDEX: 24.76 KG/M2 | TEMPERATURE: 97.7 F | HEIGHT: 78 IN | SYSTOLIC BLOOD PRESSURE: 130 MMHG

## 2023-11-17 PROCEDURE — 99213 OFFICE O/P EST LOW 20 MIN: CPT

## 2023-12-05 ENCOUNTER — APPOINTMENT (OUTPATIENT)
Dept: CARDIOLOGY | Facility: CLINIC | Age: 78
End: 2023-12-05
Payer: MEDICARE

## 2023-12-05 ENCOUNTER — NON-APPOINTMENT (OUTPATIENT)
Age: 78
End: 2023-12-05

## 2023-12-05 VITALS
HEIGHT: 73 IN | BODY MASS INDEX: 28.63 KG/M2 | OXYGEN SATURATION: 98 % | DIASTOLIC BLOOD PRESSURE: 79 MMHG | SYSTOLIC BLOOD PRESSURE: 133 MMHG | WEIGHT: 216 LBS | TEMPERATURE: 97.7 F | HEART RATE: 73 BPM

## 2023-12-05 DIAGNOSIS — E78.00 PURE HYPERCHOLESTEROLEMIA, UNSPECIFIED: ICD-10-CM

## 2023-12-05 DIAGNOSIS — E11.59 TYPE 2 DIABETES MELLITUS WITH OTHER CIRCULATORY COMPLICATIONS: ICD-10-CM

## 2023-12-05 DIAGNOSIS — F17.200 NICOTINE DEPENDENCE, UNSPECIFIED, UNCOMPLICATED: ICD-10-CM

## 2023-12-05 DIAGNOSIS — I70.229 ATHEROSCLEROSIS OF NATIVE ARTERIES OF EXTREMITIES WITH REST PAIN, UNSPECIFIED EXTREMITY: ICD-10-CM

## 2023-12-05 PROCEDURE — 99406 BEHAV CHNG SMOKING 3-10 MIN: CPT

## 2023-12-05 PROCEDURE — 93000 ELECTROCARDIOGRAM COMPLETE: CPT

## 2023-12-05 PROCEDURE — 99204 OFFICE O/P NEW MOD 45 MIN: CPT | Mod: 25

## 2023-12-05 RX ORDER — CYCLOBENZAPRINE HYDROCHLORIDE 10 MG/1
10 TABLET, FILM COATED ORAL 3 TIMES DAILY
Qty: 60 | Refills: 3 | Status: COMPLETED | COMMUNITY
Start: 2020-08-31 | End: 2023-12-05

## 2023-12-05 RX ORDER — CIPROFLOXACIN HYDROCHLORIDE 500 MG/1
500 TABLET, FILM COATED ORAL TWICE DAILY
Qty: 10 | Refills: 0 | Status: COMPLETED | COMMUNITY
Start: 2023-06-10 | End: 2023-12-05

## 2023-12-05 RX ORDER — APIXABAN 2.5 MG/1
2.5 TABLET, FILM COATED ORAL
Refills: 0 | Status: ACTIVE | COMMUNITY

## 2023-12-05 RX ORDER — SULFAMETHOXAZOLE AND TRIMETHOPRIM 800; 160 MG/1; MG/1
800-160 TABLET ORAL
Qty: 20 | Refills: 0 | Status: COMPLETED | COMMUNITY
Start: 2023-07-27 | End: 2023-12-05

## 2023-12-07 PROBLEM — I70.229 ATHEROSCLEROSIS OF ARTERY OF EXTREMITY WITH REST PAIN: Status: ACTIVE | Noted: 2023-03-17

## 2023-12-07 PROBLEM — E11.59 TYPE 2 DIABETES MELLITUS WITH OTHER CIRCULATORY COMPLICATION: Status: ACTIVE | Noted: 2023-08-23

## 2023-12-07 PROBLEM — E78.00 HYPERCHOLESTEREMIA: Status: ACTIVE | Noted: 2022-01-04

## 2023-12-08 ENCOUNTER — APPOINTMENT (OUTPATIENT)
Dept: VASCULAR SURGERY | Facility: CLINIC | Age: 78
End: 2023-12-08

## 2024-01-05 ENCOUNTER — APPOINTMENT (OUTPATIENT)
Dept: VASCULAR SURGERY | Facility: CLINIC | Age: 79
End: 2024-01-05
Payer: MEDICARE

## 2024-01-05 VITALS
HEIGHT: 73 IN | TEMPERATURE: 97.9 F | BODY MASS INDEX: 28.89 KG/M2 | HEART RATE: 71 BPM | SYSTOLIC BLOOD PRESSURE: 143 MMHG | DIASTOLIC BLOOD PRESSURE: 73 MMHG | WEIGHT: 218 LBS

## 2024-01-05 DIAGNOSIS — I77.811 ABDOMINAL AORTIC ECTASIA: ICD-10-CM

## 2024-01-05 DIAGNOSIS — I73.9 PERIPHERAL VASCULAR DISEASE, UNSPECIFIED: ICD-10-CM

## 2024-01-05 PROCEDURE — 99214 OFFICE O/P EST MOD 30 MIN: CPT

## 2024-01-05 PROCEDURE — 93978 VASCULAR STUDY: CPT

## 2024-01-05 NOTE — DATA REVIEWED
[FreeTextEntry1] : 3/17/2023 bilateral carotid artery duplex bilateral carotids < 50% ICA stenosis bilateral antegrade vertebral arteries  6/23/2023 aortoiliac duplex patent bilateral TRAVON stents <50% prox R EIA stenosis mild ectasia of suprarenal aorta incidental finding of homogenous hypoechoic mass in bladder 4.7 cm x 5.1 cm  1/5/2024 aortoiliac duplex pt did not fast before ultrasound unable to visualize aorta clearly bilateral iliac artery stents patent

## 2024-01-05 NOTE — HISTORY OF PRESENT ILLNESS
[FreeTextEntry1] : Pt presents to the office for post op visit s/p bilateral lower extremity angiogram with bilateral iliac stent placement on 3/9/2023. He was admitted to the hospital for right toe #1 ischemia for 1 month.\par  \par  History of PAD with chronic pain in bilateral buttock/thigh with walking, atherosclerosis, iliac occlusive disease, neuropathy, CKD, BPH, laryngeal ca, cervical and lumbar disc disease.\par  \par  Pt reports feeling well today. Color on right toe #1 improved. Pt experiences slight improvement with buttock/thigh pain when walking 1-2 blocks before stopping to rest. Denies leg swelling, rest pain or wounds/ulcers. \par  \par  Pt is taking low dose aspirin and xarelto 2.5 mg BID. Pt would like to switch to a different anticoagulant. He states Xarelto is too costly. Has not been taking his atorvastatin as recommended because it previously gave him leg cramps.\par  \par  Pt states he stopped smoking since hospitalization. Has not smoked since discharge. Used to smoke 1.5 to 2 ppd x 55 yrs\par  \par  Bilateral groin site well healed, c/d/i. Soft and nontender. Mild bruising on left groin.\par  \par  CTA with contrast of aorta w/ runoff reviewed from 3/4/2023\par  - atherosclerosis\par  - occlusion of the proximal left anterior tibial artery\par  - abdominal aorta 2.9 cm\par  \par  CT neck reviewed from 2015\par  - calcification near bilateral carotid bifurcation [de-identified] : 01/05/2024 Pt is here for routine follow up of bilateral iliac stents done on 3/9/23. History of PAD with chronic pain in bilateral buttock/thigh with walking, right 1st toe ischemia, mild aortic ectasia, atherosclerosis, iliac occlusive disease, neuropathy, CKD, BPH, laryngeal ca, cervical and lumbar disc disease. Pt did not fast before coming in for ultrasound.  Pt does not have any complaints today. Recently he saw Dr. Arroyo to evaluate right hip and thigh weakness which is neuromuscular in nature. He states his bilateral buttock/thigh pain is stable. Denies claudication, rest pain or tissue loss. Denies chest or abdomen pain. Reports chronic back pain. He is taking low dose aspirin, Eliquis 2.5 mg BID, and atorvastatin. He continues to smoke 1 ppd. States he is trying to quit.  Pt states he has an upcoming urolift procedure on July 13th and cervical spine surgery on July 28th.

## 2024-01-05 NOTE — PHYSICAL EXAM
[1+] : left 1+ [2+] : right 2+ [No Rash or Lesion] : No rash or lesion [Alert] : alert [Oriented to Person] : oriented to person [Oriented to Place] : oriented to place [Oriented to Time] : oriented to time [Calm] : calm [0] : left 0 [Ankle Swelling (On Exam)] : not present [Varicose Veins Of Lower Extremities] : not present [] : not present [de-identified] : NAD [de-identified] : NCAT [FreeTextEntry1] : abdomen soft and nontender bilateral lower extremities warm and well perfused, brisk capillary refill no ischemia in right toe #1 intact skin no wounds/ulcers [de-identified] : no respiratory distress [de-identified] : KARTHIKEYANL [de-identified] : mo cranial nerves 2-12 mo grossly intact [de-identified] : cooperative

## 2024-01-05 NOTE — ASSESSMENT
[Arterial/Venous Disease] : arterial/venous disease [Medication Management] : medication management [Foot care/Footwear] : foot care/footwear [Smoking Cessation] : smoking cessation [FreeTextEntry1] : Impression - Atherosclerosis, peripheral artery disease, iliac occlusive disease s/p bilateral iliac stents, abdominal aorta ectasia   Plan Conservative medical management - foot care and protection, follow up with podiatry Encouraged smoking cessation - dur 5 min Continue aspirin 81 mg, atorvastatin and eliquis 2.5 mg BID Return to office in 6 months July 2024 to eval aortic ectasia and bilateral iliac stents with aortoiliac duplex

## 2024-04-25 NOTE — ED PROVIDER NOTE - NSICDXNOFAMILYHX_GEN_ALL_ED
<-- Click to add NO pertinent Family History What Type Of Note Output Would You Prefer (Optional)?: Bullet Format How Severe Is Your Skin Lesion?: mild Is This A New Presentation, Or A Follow-Up?: Skin Lesions Additional History: No specific concerns today.

## 2024-05-23 ENCOUNTER — RX RENEWAL (OUTPATIENT)
Age: 79
End: 2024-05-23

## 2024-05-24 ENCOUNTER — APPOINTMENT (OUTPATIENT)
Dept: VASCULAR SURGERY | Facility: CLINIC | Age: 79
End: 2024-05-24
Payer: MEDICARE

## 2024-05-24 VITALS
HEART RATE: 70 BPM | HEIGHT: 73 IN | BODY MASS INDEX: 28.89 KG/M2 | SYSTOLIC BLOOD PRESSURE: 113 MMHG | DIASTOLIC BLOOD PRESSURE: 74 MMHG | WEIGHT: 218 LBS

## 2024-05-24 PROCEDURE — 99213 OFFICE O/P EST LOW 20 MIN: CPT

## 2024-05-24 PROCEDURE — 93978 VASCULAR STUDY: CPT

## 2024-05-29 RX ORDER — APIXABAN 2.5 MG/1
2.5 TABLET, FILM COATED ORAL
Qty: 180 | Refills: 3 | Status: ACTIVE | COMMUNITY
Start: 2024-05-29 | End: 1900-01-01

## 2024-06-03 DIAGNOSIS — G72.9 MYOPATHY, UNSPECIFIED: ICD-10-CM

## 2024-06-21 DIAGNOSIS — M25.559 PAIN IN UNSPECIFIED HIP: ICD-10-CM

## 2024-06-21 RX ORDER — ATORVASTATIN CALCIUM 80 MG/1
80 TABLET, FILM COATED ORAL
Qty: 90 | Refills: 1 | Status: ACTIVE | COMMUNITY
Start: 2023-04-13 | End: 1900-01-01

## 2024-06-25 ENCOUNTER — LABORATORY RESULT (OUTPATIENT)
Age: 79
End: 2024-06-25

## 2024-06-25 ENCOUNTER — RX RENEWAL (OUTPATIENT)
Age: 79
End: 2024-06-25

## 2024-06-25 LAB
25(OH)D3 SERPL-MCNC: 29.7 NG/ML
ALBUMIN SERPL ELPH-MCNC: 4.4 G/DL
ALP BLD-CCNC: 85 U/L
ALT SERPL-CCNC: 28 U/L
ANION GAP SERPL CALC-SCNC: 12 MMOL/L
APPEARANCE: CLEAR
AST SERPL-CCNC: 23 U/L
BASOPHILS # BLD AUTO: 0.01 K/UL
BASOPHILS NFR BLD AUTO: 0.1 %
BILIRUB SERPL-MCNC: 0.4 MG/DL
BILIRUBIN URINE: NEGATIVE
BLOOD URINE: NEGATIVE
BUN SERPL-MCNC: 22 MG/DL
CALCIUM SERPL-MCNC: 9.2 MG/DL
CHLORIDE SERPL-SCNC: 106 MMOL/L
CHOLEST SERPL-MCNC: 162 MG/DL
CO2 SERPL-SCNC: 24 MMOL/L
COLOR: YELLOW
CREAT SERPL-MCNC: 1.45 MG/DL
EGFR: 50 ML/MIN/1.73M2
EOSINOPHIL # BLD AUTO: 0.48 K/UL
EOSINOPHIL NFR BLD AUTO: 5.2 %
ESTIMATED AVERAGE GLUCOSE: 200 MG/DL
FOLATE SERPL-MCNC: 14.4 NG/ML
GLUCOSE QUALITATIVE U: NEGATIVE MG/DL
GLUCOSE SERPL-MCNC: 127 MG/DL
HBA1C MFR BLD HPLC: 8.6 %
HCT VFR BLD CALC: 46.5 %
HDLC SERPL-MCNC: 27 MG/DL
HGB BLD-MCNC: 14.9 G/DL
IMM GRANULOCYTES NFR BLD AUTO: 0.2 %
KETONES URINE: NEGATIVE MG/DL
LDLC SERPL CALC-MCNC: 97 MG/DL
LEUKOCYTE ESTERASE URINE: ABNORMAL
LYMPHOCYTES # BLD AUTO: 3.31 K/UL
LYMPHOCYTES NFR BLD AUTO: 36.2 %
MAGNESIUM SERPL-MCNC: 2.2 MG/DL
MAN DIFF?: NORMAL
MCHC RBC-ENTMCNC: 31.6 PG
MCHC RBC-ENTMCNC: 32 GM/DL
MCV RBC AUTO: 98.7 FL
MONOCYTES # BLD AUTO: 0.71 K/UL
MONOCYTES NFR BLD AUTO: 7.8 %
NEUTROPHILS # BLD AUTO: 4.62 K/UL
NEUTROPHILS NFR BLD AUTO: 50.5 %
NITRITE URINE: NEGATIVE
NONHDLC SERPL-MCNC: 135 MG/DL
PH URINE: 5.5
PLATELET # BLD AUTO: 220 K/UL
POTASSIUM SERPL-SCNC: 4.6 MMOL/L
PROT SERPL-MCNC: 7.3 G/DL
PROTEIN URINE: 30 MG/DL
PSA SERPL-MCNC: 1.77 NG/ML
RBC # BLD: 4.71 M/UL
RBC # FLD: 14.1 %
SODIUM SERPL-SCNC: 143 MMOL/L
SPECIFIC GRAVITY URINE: 1.02
TRIGL SERPL-MCNC: 223 MG/DL
TSH SERPL-ACNC: 0.78 UIU/ML
UROBILINOGEN URINE: 0.2 MG/DL
VIT B12 SERPL-MCNC: 433 PG/ML
WBC # FLD AUTO: 9.15 K/UL

## 2024-06-25 RX ORDER — EZETIMIBE 10 MG/1
10 TABLET ORAL
Qty: 90 | Refills: 2 | Status: ACTIVE | COMMUNITY
Start: 2023-12-05 | End: 1900-01-01

## 2024-06-28 DIAGNOSIS — E08.59 DIABETES MELLITUS DUE TO UNDERLYING CONDITION WITH OTHER CIRCULATORY COMPLICATIONS: ICD-10-CM

## 2024-07-05 ENCOUNTER — APPOINTMENT (OUTPATIENT)
Dept: VASCULAR SURGERY | Facility: CLINIC | Age: 79
End: 2024-07-05

## 2024-07-12 ENCOUNTER — APPOINTMENT (OUTPATIENT)
Dept: FAMILY MEDICINE | Facility: CLINIC | Age: 79
End: 2024-07-12

## 2024-07-12 ENCOUNTER — APPOINTMENT (OUTPATIENT)
Dept: ENDOCRINOLOGY | Facility: CLINIC | Age: 79
End: 2024-07-12
Payer: MEDICARE

## 2024-07-12 VITALS
SYSTOLIC BLOOD PRESSURE: 147 MMHG | HEIGHT: 73 IN | WEIGHT: 218 LBS | OXYGEN SATURATION: 96 % | TEMPERATURE: 98 F | BODY MASS INDEX: 28.89 KG/M2 | DIASTOLIC BLOOD PRESSURE: 85 MMHG | HEART RATE: 72 BPM

## 2024-07-12 DIAGNOSIS — G62.9 POLYNEUROPATHY, UNSPECIFIED: ICD-10-CM

## 2024-07-12 DIAGNOSIS — E11.29 TYPE 2 DIABETES MELLITUS WITH OTHER DIABETIC KIDNEY COMPLICATION: ICD-10-CM

## 2024-07-12 DIAGNOSIS — F17.200 NICOTINE DEPENDENCE, UNSPECIFIED, UNCOMPLICATED: ICD-10-CM

## 2024-07-12 DIAGNOSIS — G47.9 SLEEP DISORDER, UNSPECIFIED: ICD-10-CM

## 2024-07-12 DIAGNOSIS — Z87.891 PERSONAL HISTORY OF NICOTINE DEPENDENCE: ICD-10-CM

## 2024-07-12 DIAGNOSIS — E08.59 DIABETES MELLITUS DUE TO UNDERLYING CONDITION WITH OTHER CIRCULATORY COMPLICATIONS: ICD-10-CM

## 2024-07-12 DIAGNOSIS — Z00.00 ENCOUNTER FOR GENERAL ADULT MEDICAL EXAMINATION W/OUT ABNORMAL FINDINGS: ICD-10-CM

## 2024-07-12 DIAGNOSIS — E78.00 PURE HYPERCHOLESTEROLEMIA, UNSPECIFIED: ICD-10-CM

## 2024-07-12 PROCEDURE — G2211 COMPLEX E/M VISIT ADD ON: CPT

## 2024-07-12 PROCEDURE — 99214 OFFICE O/P EST MOD 30 MIN: CPT

## 2024-07-12 PROCEDURE — 36415 COLL VENOUS BLD VENIPUNCTURE: CPT

## 2024-07-12 PROCEDURE — 99407 BEHAV CHNG SMOKING > 10 MIN: CPT

## 2024-07-12 PROCEDURE — 81003 URINALYSIS AUTO W/O SCOPE: CPT | Mod: QW

## 2024-07-12 PROCEDURE — 95251 CONT GLUC MNTR ANALYSIS I&R: CPT

## 2024-07-12 PROCEDURE — G0296 VISIT TO DETERM LDCT ELIG: CPT

## 2024-07-12 PROCEDURE — P9615: CPT

## 2024-07-12 PROCEDURE — 95250 CONT GLUC MNTR PHYS/QHP EQP: CPT

## 2024-07-12 PROCEDURE — 99204 OFFICE O/P NEW MOD 45 MIN: CPT

## 2024-07-12 PROCEDURE — G0439: CPT

## 2024-07-12 RX ORDER — VARENICLINE TARTRATE 0.5 (11)-1
0.5 MG X 11 & KIT ORAL
Qty: 1 | Refills: 0 | Status: ACTIVE | COMMUNITY
Start: 2024-07-12 | End: 1900-01-01

## 2024-07-15 ENCOUNTER — APPOINTMENT (OUTPATIENT)
Dept: ENDOCRINOLOGY | Facility: CLINIC | Age: 79
End: 2024-07-15
Payer: MEDICARE

## 2024-07-15 PROCEDURE — G0108 DIAB MANAGE TRN  PER INDIV: CPT

## 2024-07-25 ENCOUNTER — APPOINTMENT (OUTPATIENT)
Dept: ENDOCRINOLOGY | Facility: CLINIC | Age: 79
End: 2024-07-25

## 2024-08-05 ENCOUNTER — APPOINTMENT (OUTPATIENT)
Dept: ENDOCRINOLOGY | Facility: CLINIC | Age: 79
End: 2024-08-05

## 2024-08-05 PROCEDURE — 99214 OFFICE O/P EST MOD 30 MIN: CPT | Mod: 25

## 2024-08-05 PROCEDURE — 82962 GLUCOSE BLOOD TEST: CPT

## 2024-08-05 NOTE — ASSESSMENT
[FreeTextEntry1] : T2DM  -SHIRLENE PRO results discussed today  -We discussed 2 fingersticks per day One FBG and vary the second reading.    -SHIRLENE PRO q 3 months -We again discussed the FBG, PPG goals -Begin Jardiance 10 mg qd as ordered by PCP  r&b discussed 2 week sample provided,   -Bloodwork next visit  Hyperlipidemia LDL 97, TRIG 223 Consider switch to Rosuvastatin 40 mg next visit  RTC 1 month  Diabetes Counseling: The patient was counseled on diabetes foot care, long term vascular complications of diabetes, carbohydrate consistent diet, importance of diet and exercise to improve glycemic control, achieve weight loss and improve cardiovascular health, exercise/effect on glucose, hypoglycemia management, glucagon use, ketone testing, action and use of short and long-acting insulin, self-monitoring of blood glucose, insulin self-administration, injection technique, storage, and sharps disposal and sick-day management.  Patient was referred to ophthalmology for retinopathy screening. Risks and benefits of diabetic medications were discussed.

## 2024-08-05 NOTE — PROCEDURE
[FreeTextEntry1] : General: In no acute distress.  Head: Normocephalic  Skin: Normal, no bruises. There are no cushingoid features  Eyes: No pallor, lid lag or orbitopathy.  Neck: No audible carotid bruit  Thyroid: palpable, non-tender. No audible bruit. Not enlarged. No palpable nodules. Pembertons negative  Lymph nodes: no palpable neck lymphadenopathy.  Chest: Lungs clear to auscultation  Heart: S1, S2, no murmurs  Abdomen: No tenderness, no masses, no striae  Neurological: Deep tendon reflexes symmetrical, 2+ (biceps, brachioradialis, patellar).  Lower Extremities: No edema, no cyanosis  Foot exam: No ulcers, no onychomycotic nail changes. Good pedal pulses. 10-g [place] monofilament testing is normal bilaterally. Vibratory sensation with 128-Hz tuning fork is preserved bilaterally.

## 2024-08-05 NOTE — HISTORY OF PRESENT ILLNESS
[FreeTextEntry1] : ***Aug. 5, 2024*** Mr Rendon feels well overall, does report fatigue initially with beginning Jardiance 10 mg qd but this has improved, He has not started Chantix therapy, still smokes tobacco, Increased hydration, kept a food journal but did not bring will check against [employer] report at home, He stopped drinking alcohol and has avoided pasta potatoes ice cream and bread.   bg in office is 125 mg/dL fasting Date of download:  08/05/2024  Diabetes Medications and Dosage: as above  Indication for CGMS: verify a change in the treatment regimen, identify periods of hypoglycemia/ hyperglycemia.  Modal day report: pattern.  Pt with HYPO  1% of the time (NONE below 54), 90% in target range  Hyperglycemia:  9% elevation  Identified issues: carbohydrate counting  dates analyzed: 07/12/2024 - 08/05/2024 He denies subjective HYPOs  ***July 12, 2024*** Mr. RENDON was diagnosed with Diabetes Mellitus Type 2 in June 2024 . and arrives today for initial evaluation of glycemic control.   He reports history of 90 pack year history tobacco smoke, PAD, HLD, HTN, neuropathy described as all day burning sensation ble.  He denies known complications of retinopathy He is presently not on any medications   Blood sugars are not checked.  Pt denies subjective HYPOs Diet: not following ADA, reports the following typical daily routine: Wakes up at 6-7a. Drinks Coffee black with cigarette.   Breakfast is at 7am and usually consists of more coffee and 5 more cigarettes.  Lunch is at 1p and usually consists of two english muffins with butter.  Dinner is at 6-7p and usually consists of meat or fish with vegetables.  Does not eat after dinner.   Exercise: will start swimming quit smoking   Lab review: a1c- 8.6   Last dilated eye -  Last podiatry visit  -  Last cardiology evaluation -  Last stress test -  Last 2-D Echo -  Last nephrology evaluation -  Last neurology evaluation-
[FreeTextEntry1] : ***Aug. 5, 2024*** Mr Rendon feels well overall, does report fatigue initially with beginning Jardiance 10 mg qd but this has improved, He has not started Chantix therapy, still smokes tobacco, Increased hydration, kept a food journal but did not bring will check against [employer] report at home, He stopped drinking alcohol and has avoided pasta potatoes ice cream and bread.   bg in office is 125 mg/dL fasting Date of download:  08/05/2024  Diabetes Medications and Dosage: as above  Indication for CGMS: verify a change in the treatment regimen, identify periods of hypoglycemia/ hyperglycemia.  Modal day report: pattern.  Pt with HYPO  1% of the time (NONE below 54), 90% in target range  Hyperglycemia:  9% elevation  Identified issues: carbohydrate counting  dates analyzed: 07/12/2024 - 08/05/2024 He denies subjective HYPOs  ***July 12, 2024*** Mr. RENDON was diagnosed with Diabetes Mellitus Type 2 in June 2024 . and arrives today for initial evaluation of glycemic control.   He reports history of 90 pack year history tobacco smoke, PAD, HLD, HTN, neuropathy described as all day burning sensation ble.  He denies known complications of retinopathy He is presently not on any medications   Blood sugars are not checked.  Pt denies subjective HYPOs Diet: not following ADA, reports the following typical daily routine: Wakes up at 6-7a. Drinks Coffee black with cigarette.   Breakfast is at 7am and usually consists of more coffee and 5 more cigarettes.  Lunch is at 1p and usually consists of two english muffins with butter.  Dinner is at 6-7p and usually consists of meat or fish with vegetables.  Does not eat after dinner.   Exercise: will start swimming quit smoking   Lab review: a1c- 8.6   Last dilated eye -  Last podiatry visit  -  Last cardiology evaluation -  Last stress test -  Last 2-D Echo -  Last nephrology evaluation -  Last neurology evaluation-
ambulatory

## 2024-08-05 NOTE — PROCEDURE
[FreeTextEntry1] : General: In no acute distress.  Head: Normocephalic  Skin: Normal, no bruises. There are no cushingoid features  Eyes: No pallor, lid lag or orbitopathy.  Neck: No audible carotid bruit  Thyroid: palpable, non-tender. No audible bruit. Not enlarged. No palpable nodules. Pembertons negative  Lymph nodes: no palpable neck lymphadenopathy.  Chest: Lungs clear to auscultation  Heart: S1, S2, no murmurs  Abdomen: No tenderness, no masses, no striae  Neurological: Deep tendon reflexes symmetrical, 2+ (biceps, brachioradialis, patellar).  Lower Extremities: No edema, no cyanosis  Foot exam: No ulcers, no onychomycotic nail changes. Good pedal pulses. 10-g [place]-Nessa monofilament testing is normal bilaterally. Vibratory sensation with 128-Hz tuning fork is preserved bilaterally.

## 2024-09-17 ENCOUNTER — APPOINTMENT (OUTPATIENT)
Dept: ENDOCRINOLOGY | Facility: CLINIC | Age: 79
End: 2024-09-17

## 2024-09-17 VITALS
RESPIRATION RATE: 20 BRPM | DIASTOLIC BLOOD PRESSURE: 72 MMHG | TEMPERATURE: 97.6 F | BODY MASS INDEX: 28.76 KG/M2 | OXYGEN SATURATION: 98 % | HEART RATE: 70 BPM | WEIGHT: 217 LBS | HEIGHT: 73 IN | SYSTOLIC BLOOD PRESSURE: 115 MMHG

## 2024-09-17 DIAGNOSIS — E08.59 DIABETES MELLITUS DUE TO UNDERLYING CONDITION WITH OTHER CIRCULATORY COMPLICATIONS: ICD-10-CM

## 2024-09-17 DIAGNOSIS — E11.29 TYPE 2 DIABETES MELLITUS WITH OTHER DIABETIC KIDNEY COMPLICATION: ICD-10-CM

## 2024-09-17 DIAGNOSIS — E78.00 PURE HYPERCHOLESTEROLEMIA, UNSPECIFIED: ICD-10-CM

## 2024-09-17 LAB
25(OH)D3 SERPL-MCNC: 35.1 NG/ML
ALBUMIN SERPL ELPH-MCNC: 4.4 G/DL
ALP BLD-CCNC: 89 U/L
ALT SERPL-CCNC: 26 U/L
ANION GAP SERPL CALC-SCNC: 14 MMOL/L
AST SERPL-CCNC: 23 U/L
BASOPHILS # BLD AUTO: 0.03 K/UL
BASOPHILS NFR BLD AUTO: 0.3 %
BILIRUB SERPL-MCNC: 0.3 MG/DL
BUN SERPL-MCNC: 32 MG/DL
CALCIUM SERPL-MCNC: 9.2 MG/DL
CHLORIDE SERPL-SCNC: 106 MMOL/L
CHOLEST SERPL-MCNC: 130 MG/DL
CO2 SERPL-SCNC: 20 MMOL/L
CREAT SERPL-MCNC: 1.6 MG/DL
CREAT SPEC-SCNC: 81 MG/DL
EGFR: 44 ML/MIN/1.73M2
EOSINOPHIL # BLD AUTO: 0.5 K/UL
EOSINOPHIL NFR BLD AUTO: 5.8 %
ESTIMATED AVERAGE GLUCOSE: 166 MG/DL
FOLATE SERPL-MCNC: 14.7 NG/ML
FRUCTOSAMINE SERPL-MCNC: 304 UMOL/L
GLUCOSE BLDC GLUCOMTR-MCNC: 145
GLUCOSE SERPL-MCNC: 144 MG/DL
GLYCOMARK.: 2.2 UG/ML
HBA1C MFR BLD HPLC: 7.4 %
HCT VFR BLD CALC: 49.2 %
HDLC SERPL-MCNC: 26 MG/DL
HGB BLD-MCNC: 15.9 G/DL
IMM GRANULOCYTES NFR BLD AUTO: 0.2 %
LDLC SERPL CALC-MCNC: 73 MG/DL
LYMPHOCYTES # BLD AUTO: 2.41 K/UL
LYMPHOCYTES NFR BLD AUTO: 27.8 %
MAN DIFF?: NORMAL
MCHC RBC-ENTMCNC: 32.3 GM/DL
MCHC RBC-ENTMCNC: 32.9 PG
MCV RBC AUTO: 101.7 FL
MICROALBUMIN 24H UR DL<=1MG/L-MCNC: 3.4 MG/DL
MICROALBUMIN/CREAT 24H UR-RTO: 42 MG/G
MONOCYTES # BLD AUTO: 0.69 K/UL
MONOCYTES NFR BLD AUTO: 8 %
NEUTROPHILS # BLD AUTO: 5.02 K/UL
NEUTROPHILS NFR BLD AUTO: 57.9 %
NONHDLC SERPL-MCNC: 104 MG/DL
PLATELET # BLD AUTO: 196 K/UL
POTASSIUM SERPL-SCNC: 4.6 MMOL/L
PROT SERPL-MCNC: 7.5 G/DL
RBC # BLD: 4.84 M/UL
RBC # FLD: 14.4 %
SODIUM SERPL-SCNC: 141 MMOL/L
T4 FREE SERPL-MCNC: 1.1 NG/DL
TRIGL SERPL-MCNC: 185 MG/DL
TSH SERPL-ACNC: 0.7 UIU/ML
VIT B12 SERPL-MCNC: 491 PG/ML
WBC # FLD AUTO: 8.67 K/UL

## 2024-09-17 PROCEDURE — 99214 OFFICE O/P EST MOD 30 MIN: CPT

## 2024-09-17 PROCEDURE — 82962 GLUCOSE BLOOD TEST: CPT

## 2024-09-24 NOTE — PROCEDURE
[FreeTextEntry1] : General: In no acute distress.  Head: Normocephalic  Skin: Normal, no bruises. There are no cushingoid features  Eyes: No pallor, lid lag or orbitopathy.  Neck: No audible carotid bruit  Thyroid: palpable, non-tender. No audible bruit. Not enlarged. No palpable nodules. Pembertons negative  Lymph nodes: no palpable neck lymphadenopathy.  Chest: Lungs clear to auscultation  Heart: S1, S2, no murmurs  Abdomen: No tenderness, no masses, no striae  Neurological: Deep tendon reflexes symmetrical, 2+ (biceps, brachioradialis, patellar).  Lower Extremities: No edema, no cyanosis  Foot exam: No ulcers, no onychomycotic nail changes. Good pedal pulses. 10-g [place]-Nessa monofilament testing is normal bilaterally. Vibratory sensation with 128-Hz tuning fork is preserved bilaterally.

## 2024-09-24 NOTE — PROCEDURE
[FreeTextEntry1] : General: In no acute distress.  Head: Normocephalic  Skin: Normal, no bruises. There are no cushingoid features  Eyes: No pallor, lid lag or orbitopathy.  Neck: No audible carotid bruit  Thyroid: palpable, non-tender. No audible bruit. Not enlarged. No palpable nodules. Pembertons negative  Lymph nodes: no palpable neck lymphadenopathy.  Chest: Lungs clear to auscultation  Heart: S1, S2, no murmurs  Abdomen: No tenderness, no masses, no striae  Neurological: Deep tendon reflexes symmetrical, 2+ (biceps, brachioradialis, patellar).  Lower Extremities: No edema, no cyanosis  Foot exam: No ulcers, no onychomycotic nail changes. Good pedal pulses. 10-g [place]-Nessa monofilament testing is normal bilaterally. Vibratory sensation with 128-Hz tuning fork is preserved bilaterally.

## 2024-09-24 NOTE — HISTORY OF PRESENT ILLNESS
[FreeTextEntry1] : ***Sept. 17, 2024*** To feels well overall denies new complaints he is presently on Jardiance 10mg qd.  Has not started chantix.  He has maintained dietary restrictions and is trying to increase physical activity.  bg in office is 145 mg/dL ***Aug. 5, 2024*** Mr Rendon feels well overall, does report fatigue initially with beginning Jardiance 10 mg qd but this has improved, He has not started Chantix therapy, still smokes tobacco, Increased hydration, kept a food journal but did not bring will check against [employer] report at home, He stopped drinking alcohol and has avoided pasta potatoes ice cream and bread.   bg in office is 125 mg/dL fasting Date of download:  08/05/2024  Diabetes Medications and Dosage: as above  Indication for CGMS: verify a change in the treatment regimen, identify periods of hypoglycemia/ hyperglycemia.  Modal day report: pattern.  Pt with HYPO  1% of the time (NONE below 54), 90% in target range  Hyperglycemia:  9% elevation  Identified issues: carbohydrate counting  dates analyzed: 07/12/2024 - 08/05/2024 He denies subjective HYPOs  ***July 12, 2024*** Mr. RENDON was diagnosed with Diabetes Mellitus Type 2 in June 2024 . and arrives today for initial evaluation of glycemic control.   He reports history of 90 pack year history tobacco smoke, PAD, HLD, HTN, neuropathy described as all day burning sensation ble.  He denies known complications of retinopathy He is presently not on any medications   Blood sugars are not checked.  Pt denies subjective HYPOs Diet: not following ADA, reports the following typical daily routine: Wakes up at 6-7a. Drinks Coffee black with cigarette.   Breakfast is at 7am and usually consists of more coffee and 5 more cigarettes.  Lunch is at 1p and usually consists of two english muffins with butter.  Dinner is at 6-7p and usually consists of meat or fish with vegetables.  Does not eat after dinner.   Exercise: will start swimming quit smoking   Lab review: a1c- 8.6   Last dilated eye -  Last podiatry visit  -  Last cardiology evaluation -  Last stress test -  Last 2-D Echo -  Last nephrology evaluation -  Last neurology evaluation-

## 2024-09-24 NOTE — HISTORY OF PRESENT ILLNESS
[FreeTextEntry1] : ***Sept. 17, 2024*** To feels well overall denies new complaints he is presently on Jardiance 10mg qd.  Has not started chantix.  He has maintained dietary restrictions and is trying to increase physical activity.  bg in office is 145 mg/dL ***Aug. 5, 2024*** Mr Rendon feels well overall, does report fatigue initially with beginning Jardiance 10 mg qd but this has improved, He has not started Chantix therapy, still smokes tobacco, Increased hydration, kept a food journal but did not bring will check against [employer] report at home, He stopped drinking alcohol and has avoided pasta potatoes ice cream and bread.   bg in office is 125 mg/dL fasting Date of download:  08/05/2024  Diabetes Medications and Dosage: as above  Indication for CGMS: verify a change in the treatment regimen, identify periods of hypoglycemia/ hyperglycemia.  Modal day report: pattern.  Pt with HYPO  1% of the time (NONE below 54), 90% in target range  Hyperglycemia:  9% elevation  Identified issues: carbohydrate counting  dates analyzed: 07/12/2024 - 08/05/2024 He denies subjective HYPOs  ***July 12, 2024*** Mr. RENDON was diagnosed with Diabetes Mellitus Type 2 in June 2024 . and arrives today for initial evaluation of glycemic control.   He reports history of 90 pack year history tobacco smoke, PAD, HLD, HTN, neuropathy described as all day burning sensation ble.  He denies known complications of retinopathy He is presently not on any medications   Blood sugars are not checked.  Pt denies subjective HYPOs Diet: not following ADA, reports the following typical daily routine: Wakes up at 6-7a. Drinks Coffee black with cigarette.   Breakfast is at 7am and usually consists of more coffee and 5 more cigarettes.  Lunch is at 1p and usually consists of two english muffins with butter.  Dinner is at 6-7p and usually consists of meat or fish with vegetables.  Does not eat after dinner.   Exercise: will start swimming quit smoking   Lab review: a1c- 8.6   Last dilated eye -  Last podiatry visit  -  Last cardiology evaluation -  Last stress test -  Last 2-D Echo -  Last nephrology evaluation -  Last neurology evaluation-

## 2024-09-24 NOTE — ASSESSMENT
[FreeTextEntry1] : T2DM  -[facility] next visit -Bloodwork today -We again discussed the FBG, PPG goals -Increase Jardiance 25mg qd  sample provided Hyperlipidemia LDL 97, TRIG 223 LIPID panel today  RTC 3 months  Diabetes Counseling: The patient was counseled on diabetes foot care, long term vascular complications of diabetes, carbohydrate consistent diet, importance of diet and exercise to improve glycemic control, achieve weight loss and improve cardiovascular health, exercise/effect on glucose, hypoglycemia management, glucagon use, ketone testing, action and use of short and long-acting insulin, self-monitoring of blood glucose, insulin self-administration, injection technique, storage, and sharps disposal and sick-day management.  Patient was referred to ophthalmology for retinopathy screening. Risks and benefits of diabetic medications were discussed.

## 2024-09-24 NOTE — PROCEDURE
[FreeTextEntry1] : General: In no acute distress.  Head: Normocephalic  Skin: Normal, no bruises. There are no cushingoid features  Eyes: No pallor, lid lag or orbitopathy.  Neck: No audible carotid bruit  Thyroid: palpable, non-tender. No audible bruit. Not enlarged. No palpable nodules. Pembertons negative  Lymph nodes: no palpable neck lymphadenopathy.  Chest: Lungs clear to auscultation  Heart: S1, S2, no murmurs  Abdomen: No tenderness, no masses, no striae  Neurological: Deep tendon reflexes symmetrical, 2+ (biceps, brachioradialis, patellar).  Lower Extremities: No edema, no cyanosis  Foot exam: No ulcers, no onychomycotic nail changes. Good pedal pulses. 10-g [place]-Nessa monofilament testing is normal bilaterally. Vibratory sensation with 128-Hz tuning fork is preserved bilaterally.

## 2024-11-22 DIAGNOSIS — E11.40 TYPE 2 DIABETES MELLITUS WITH DIABETIC NEUROPATHY, UNSPECIFIED: ICD-10-CM

## 2024-12-06 ENCOUNTER — APPOINTMENT (OUTPATIENT)
Dept: VASCULAR SURGERY | Facility: CLINIC | Age: 79
End: 2024-12-06
Payer: MEDICARE

## 2024-12-06 VITALS
HEIGHT: 73 IN | DIASTOLIC BLOOD PRESSURE: 81 MMHG | WEIGHT: 220 LBS | BODY MASS INDEX: 29.16 KG/M2 | HEART RATE: 65 BPM | TEMPERATURE: 97.6 F | SYSTOLIC BLOOD PRESSURE: 145 MMHG

## 2024-12-06 DIAGNOSIS — I73.9 PERIPHERAL VASCULAR DISEASE, UNSPECIFIED: ICD-10-CM

## 2024-12-06 DIAGNOSIS — I77.811 ABDOMINAL AORTIC ECTASIA: ICD-10-CM

## 2024-12-06 PROCEDURE — 99406 BEHAV CHNG SMOKING 3-10 MIN: CPT

## 2024-12-06 PROCEDURE — 99214 OFFICE O/P EST MOD 30 MIN: CPT | Mod: 25

## 2024-12-06 PROCEDURE — 93970 EXTREMITY STUDY: CPT

## 2024-12-17 ENCOUNTER — APPOINTMENT (OUTPATIENT)
Dept: ENDOCRINOLOGY | Facility: CLINIC | Age: 79
End: 2024-12-17

## 2024-12-17 VITALS
HEIGHT: 73 IN | HEART RATE: 71 BPM | DIASTOLIC BLOOD PRESSURE: 74 MMHG | TEMPERATURE: 97.1 F | RESPIRATION RATE: 20 BRPM | WEIGHT: 214 LBS | SYSTOLIC BLOOD PRESSURE: 138 MMHG | OXYGEN SATURATION: 98 % | BODY MASS INDEX: 28.36 KG/M2

## 2024-12-17 DIAGNOSIS — E08.59 DIABETES MELLITUS DUE TO UNDERLYING CONDITION WITH OTHER CIRCULATORY COMPLICATIONS: ICD-10-CM

## 2024-12-17 DIAGNOSIS — E07.9 DISORDER OF THYROID, UNSPECIFIED: ICD-10-CM

## 2024-12-17 DIAGNOSIS — E78.00 PURE HYPERCHOLESTEROLEMIA, UNSPECIFIED: ICD-10-CM

## 2024-12-17 LAB
25(OH)D3 SERPL-MCNC: 21.9 NG/ML
ALBUMIN SERPL ELPH-MCNC: 4.4 G/DL
ALP BLD-CCNC: 82 U/L
ALT SERPL-CCNC: 21 U/L
ANION GAP SERPL CALC-SCNC: 13 MMOL/L
AST SERPL-CCNC: 19 U/L
BASOPHILS # BLD AUTO: 0.07 K/UL
BASOPHILS NFR BLD AUTO: 0.7 %
BILIRUB SERPL-MCNC: 0.2 MG/DL
BUN SERPL-MCNC: 30 MG/DL
CALCIUM SERPL-MCNC: 9.4 MG/DL
CHLORIDE SERPL-SCNC: 107 MMOL/L
CHOLEST SERPL-MCNC: 154 MG/DL
CO2 SERPL-SCNC: 22 MMOL/L
CREAT SERPL-MCNC: 1.62 MG/DL
CREAT SPEC-SCNC: 94 MG/DL
EGFR: 43 ML/MIN/1.73M2
EOSINOPHIL # BLD AUTO: 0.68 K/UL
EOSINOPHIL NFR BLD AUTO: 7 %
ESTIMATED AVERAGE GLUCOSE: 177 MG/DL
FOLATE SERPL-MCNC: 10.2 NG/ML
FRUCTOSAMINE SERPL-MCNC: 289 UMOL/L
GLUCOSE BLDC GLUCOMTR-MCNC: 116
GLUCOSE SERPL-MCNC: 117 MG/DL
GLYCOMARK.: 1.4 UG/ML
HBA1C MFR BLD HPLC: 7.8 %
HCT VFR BLD CALC: 50.9 %
HDLC SERPL-MCNC: 33 MG/DL
HGB BLD-MCNC: 16.3 G/DL
IMM GRANULOCYTES NFR BLD AUTO: 0.2 %
LDLC SERPL CALC-MCNC: 94 MG/DL
LYMPHOCYTES # BLD AUTO: 2.7 K/UL
LYMPHOCYTES NFR BLD AUTO: 27.8 %
MAN DIFF?: NORMAL
MCHC RBC-ENTMCNC: 32 G/DL
MCHC RBC-ENTMCNC: 32.1 PG
MCV RBC AUTO: 100.2 FL
MICROALBUMIN 24H UR DL<=1MG/L-MCNC: 4.6 MG/DL
MICROALBUMIN/CREAT 24H UR-RTO: 49 MG/G
MONOCYTES # BLD AUTO: 0.78 K/UL
MONOCYTES NFR BLD AUTO: 8 %
NEUTROPHILS # BLD AUTO: 5.46 K/UL
NEUTROPHILS NFR BLD AUTO: 56.3 %
NONHDLC SERPL-MCNC: 122 MG/DL
PLATELET # BLD AUTO: 202 K/UL
POTASSIUM SERPL-SCNC: 4.5 MMOL/L
PROT SERPL-MCNC: 7.3 G/DL
RBC # BLD: 5.08 M/UL
RBC # FLD: 14.5 %
SODIUM SERPL-SCNC: 142 MMOL/L
T4 FREE SERPL-MCNC: 1 NG/DL
TRIGL SERPL-MCNC: 160 MG/DL
TSH SERPL-ACNC: 0.94 UIU/ML
VIT B12 SERPL-MCNC: 405 PG/ML
WBC # FLD AUTO: 9.71 K/UL

## 2024-12-17 PROCEDURE — 99214 OFFICE O/P EST MOD 30 MIN: CPT

## 2024-12-17 PROCEDURE — 82962 GLUCOSE BLOOD TEST: CPT

## 2024-12-17 PROCEDURE — 36415 COLL VENOUS BLD VENIPUNCTURE: CPT

## 2025-02-26 ENCOUNTER — APPOINTMENT (OUTPATIENT)
Dept: ENDOCRINOLOGY | Facility: CLINIC | Age: 80
End: 2025-02-26
Payer: MEDICARE

## 2025-02-26 VITALS
RESPIRATION RATE: 20 BRPM | BODY MASS INDEX: 28.1 KG/M2 | OXYGEN SATURATION: 94 % | TEMPERATURE: 97.5 F | HEIGHT: 73 IN | SYSTOLIC BLOOD PRESSURE: 137 MMHG | WEIGHT: 212 LBS | HEART RATE: 94 BPM | DIASTOLIC BLOOD PRESSURE: 79 MMHG

## 2025-02-26 DIAGNOSIS — E78.00 PURE HYPERCHOLESTEROLEMIA, UNSPECIFIED: ICD-10-CM

## 2025-02-26 DIAGNOSIS — E08.59 DIABETES MELLITUS DUE TO UNDERLYING CONDITION WITH OTHER CIRCULATORY COMPLICATIONS: ICD-10-CM

## 2025-02-26 LAB
25(OH)D3 SERPL-MCNC: 19.8 NG/ML
ALBUMIN SERPL ELPH-MCNC: 4.3 G/DL
ALP BLD-CCNC: 87 U/L
ALT SERPL-CCNC: 24 U/L
ANION GAP SERPL CALC-SCNC: 18 MMOL/L
AST SERPL-CCNC: 21 U/L
BASOPHILS # BLD AUTO: 0.02 K/UL
BASOPHILS NFR BLD AUTO: 0.2 %
BILIRUB SERPL-MCNC: 0.5 MG/DL
BUN SERPL-MCNC: 30 MG/DL
CALCIUM SERPL-MCNC: 9.1 MG/DL
CHLORIDE SERPL-SCNC: 105 MMOL/L
CHOLEST SERPL-MCNC: 251 MG/DL
CO2 SERPL-SCNC: 21 MMOL/L
CREAT SERPL-MCNC: 1.64 MG/DL
CREAT SPEC-SCNC: 93 MG/DL
EGFR: 43 ML/MIN/1.73M2
EOSINOPHIL # BLD AUTO: 0.62 K/UL
EOSINOPHIL NFR BLD AUTO: 7.5 %
ESTIMATED AVERAGE GLUCOSE: 160 MG/DL
FOLATE SERPL-MCNC: 14.4 NG/ML
FRUCTOSAMINE SERPL-MCNC: 284 UMOL/L
GLUCOSE BLDC GLUCOMTR-MCNC: 106
GLUCOSE SERPL-MCNC: 93 MG/DL
GLYCOMARK.: 1.6 UG/ML
HBA1C MFR BLD HPLC: 7.2 %
HCT VFR BLD CALC: 50.4 %
HDLC SERPL-MCNC: 28 MG/DL
HGB BLD-MCNC: 16.9 G/DL
IMM GRANULOCYTES NFR BLD AUTO: 0.4 %
LDLC SERPL CALC-MCNC: 168 MG/DL
LYMPHOCYTES # BLD AUTO: 2.42 K/UL
LYMPHOCYTES NFR BLD AUTO: 29.2 %
MAN DIFF?: NORMAL
MCHC RBC-ENTMCNC: 31.8 PG
MCHC RBC-ENTMCNC: 33.5 G/DL
MCV RBC AUTO: 94.7 FL
MICROALBUMIN 24H UR DL<=1MG/L-MCNC: 4 MG/DL
MICROALBUMIN/CREAT 24H UR-RTO: 43 MG/G
MONOCYTES # BLD AUTO: 0.76 K/UL
MONOCYTES NFR BLD AUTO: 9.2 %
NEUTROPHILS # BLD AUTO: 4.45 K/UL
NEUTROPHILS NFR BLD AUTO: 53.5 %
NONHDLC SERPL-MCNC: 223 MG/DL
PLATELET # BLD AUTO: 219 K/UL
POTASSIUM SERPL-SCNC: 4.6 MMOL/L
PROT SERPL-MCNC: 7.4 G/DL
RBC # BLD: 5.32 M/UL
RBC # FLD: 14.6 %
SODIUM SERPL-SCNC: 143 MMOL/L
T4 FREE SERPL-MCNC: 1.7 NG/DL
TRIGL SERPL-MCNC: 289 MG/DL
TSH SERPL-ACNC: 0.01 UIU/ML
VIT B12 SERPL-MCNC: 427 PG/ML
WBC # FLD AUTO: 8.3 K/UL

## 2025-02-26 PROCEDURE — 99214 OFFICE O/P EST MOD 30 MIN: CPT

## 2025-02-26 PROCEDURE — 95251 CONT GLUC MNTR ANALYSIS I&R: CPT

## 2025-02-26 PROCEDURE — 95250 CONT GLUC MNTR PHYS/QHP EQP: CPT

## 2025-02-26 PROCEDURE — 82962 GLUCOSE BLOOD TEST: CPT

## 2025-02-28 ENCOUNTER — APPOINTMENT (OUTPATIENT)
Dept: VASCULAR SURGERY | Facility: CLINIC | Age: 80
End: 2025-02-28
Payer: MEDICARE

## 2025-02-28 VITALS
HEIGHT: 73 IN | DIASTOLIC BLOOD PRESSURE: 83 MMHG | BODY MASS INDEX: 28.1 KG/M2 | TEMPERATURE: 97.4 F | SYSTOLIC BLOOD PRESSURE: 137 MMHG | WEIGHT: 212 LBS | HEART RATE: 77 BPM

## 2025-02-28 DIAGNOSIS — I77.811 ABDOMINAL AORTIC ECTASIA: ICD-10-CM

## 2025-02-28 DIAGNOSIS — I73.9 PERIPHERAL VASCULAR DISEASE, UNSPECIFIED: ICD-10-CM

## 2025-02-28 PROCEDURE — 99406 BEHAV CHNG SMOKING 3-10 MIN: CPT

## 2025-02-28 PROCEDURE — 93971 EXTREMITY STUDY: CPT | Mod: RT

## 2025-02-28 PROCEDURE — 99214 OFFICE O/P EST MOD 30 MIN: CPT | Mod: 25

## 2025-05-07 ENCOUNTER — APPOINTMENT (OUTPATIENT)
Dept: CARDIOLOGY | Facility: CLINIC | Age: 80
End: 2025-05-07
Payer: MEDICARE

## 2025-05-07 VITALS
HEIGHT: 73 IN | BODY MASS INDEX: 28.1 KG/M2 | HEART RATE: 69 BPM | DIASTOLIC BLOOD PRESSURE: 64 MMHG | RESPIRATION RATE: 19 BRPM | WEIGHT: 212 LBS | SYSTOLIC BLOOD PRESSURE: 125 MMHG | OXYGEN SATURATION: 97 % | TEMPERATURE: 97.8 F

## 2025-05-07 DIAGNOSIS — I77.811 ABDOMINAL AORTIC ECTASIA: ICD-10-CM

## 2025-05-07 DIAGNOSIS — F17.200 NICOTINE DEPENDENCE, UNSPECIFIED, UNCOMPLICATED: ICD-10-CM

## 2025-05-07 DIAGNOSIS — I77.1 STRICTURE OF ARTERY: ICD-10-CM

## 2025-05-07 DIAGNOSIS — I73.9 PERIPHERAL VASCULAR DISEASE, UNSPECIFIED: ICD-10-CM

## 2025-05-07 DIAGNOSIS — E11.59 TYPE 2 DIABETES MELLITUS WITH OTHER CIRCULATORY COMPLICATIONS: ICD-10-CM

## 2025-05-07 DIAGNOSIS — I70.229 ATHEROSCLEROSIS OF NATIVE ARTERIES OF EXTREMITIES WITH REST PAIN, UNSPECIFIED EXTREMITY: ICD-10-CM

## 2025-05-07 DIAGNOSIS — E78.00 PURE HYPERCHOLESTEROLEMIA, UNSPECIFIED: ICD-10-CM

## 2025-05-07 DIAGNOSIS — E08.59 DIABETES MELLITUS DUE TO UNDERLYING CONDITION WITH OTHER CIRCULATORY COMPLICATIONS: ICD-10-CM

## 2025-05-07 PROCEDURE — 36415 COLL VENOUS BLD VENIPUNCTURE: CPT

## 2025-05-07 PROCEDURE — 99406 BEHAV CHNG SMOKING 3-10 MIN: CPT

## 2025-05-07 PROCEDURE — 99214 OFFICE O/P EST MOD 30 MIN: CPT | Mod: 25

## 2025-05-07 PROCEDURE — G2211 COMPLEX E/M VISIT ADD ON: CPT

## 2025-05-09 ENCOUNTER — MESSAGE (OUTPATIENT)
Age: 80
End: 2025-05-09

## 2025-05-09 LAB
APO LP(A) SERPL-MCNC: <9 NMOL/L
LDLC SERPL DIRECT ASSAY-MCNC: 136 MG/DL

## 2025-05-14 RX ORDER — EVOLOCUMAB 140 MG/ML
140 INJECTION, SOLUTION SUBCUTANEOUS
Qty: 3 | Refills: 3 | Status: ACTIVE | COMMUNITY
Start: 2025-05-14 | End: 1900-01-01

## 2025-06-12 ENCOUNTER — OUTPATIENT (OUTPATIENT)
Dept: OUTPATIENT SERVICES | Facility: HOSPITAL | Age: 80
LOS: 1 days | End: 2025-06-12
Payer: MEDICARE

## 2025-06-12 ENCOUNTER — APPOINTMENT (OUTPATIENT)
Dept: MRI IMAGING | Facility: CLINIC | Age: 80
End: 2025-06-12
Payer: MEDICARE

## 2025-06-12 DIAGNOSIS — Z98.89 OTHER SPECIFIED POSTPROCEDURAL STATES: Chronic | ICD-10-CM

## 2025-06-12 DIAGNOSIS — E04.1 NONTOXIC SINGLE THYROID NODULE: Chronic | ICD-10-CM

## 2025-06-12 DIAGNOSIS — M48.07 SPINAL STENOSIS, LUMBOSACRAL REGION: ICD-10-CM

## 2025-06-12 DIAGNOSIS — J38.7 OTHER DISEASES OF LARYNX: Chronic | ICD-10-CM

## 2025-06-12 PROCEDURE — 72148 MRI LUMBAR SPINE W/O DYE: CPT | Mod: 26

## 2025-06-12 PROCEDURE — 72148 MRI LUMBAR SPINE W/O DYE: CPT

## 2025-06-13 ENCOUNTER — APPOINTMENT (OUTPATIENT)
Dept: VASCULAR SURGERY | Facility: CLINIC | Age: 80
End: 2025-06-13
Payer: MEDICARE

## 2025-06-13 VITALS
BODY MASS INDEX: 29.95 KG/M2 | SYSTOLIC BLOOD PRESSURE: 140 MMHG | WEIGHT: 226 LBS | TEMPERATURE: 98 F | HEART RATE: 65 BPM | DIASTOLIC BLOOD PRESSURE: 84 MMHG | HEIGHT: 73 IN

## 2025-06-13 PROCEDURE — 93978 VASCULAR STUDY: CPT

## 2025-06-13 PROCEDURE — 99213 OFFICE O/P EST LOW 20 MIN: CPT

## 2025-06-17 ENCOUNTER — APPOINTMENT (OUTPATIENT)
Dept: PHYSICAL MEDICINE AND REHAB | Facility: CLINIC | Age: 80
End: 2025-06-17
Payer: MEDICARE

## 2025-06-17 VITALS
SYSTOLIC BLOOD PRESSURE: 136 MMHG | TEMPERATURE: 97.6 F | HEART RATE: 65 BPM | RESPIRATION RATE: 18 BRPM | HEIGHT: 73 IN | DIASTOLIC BLOOD PRESSURE: 84 MMHG | WEIGHT: 219 LBS | OXYGEN SATURATION: 97 % | BODY MASS INDEX: 29.03 KG/M2

## 2025-06-17 PROCEDURE — 99203 OFFICE O/P NEW LOW 30 MIN: CPT

## 2025-06-27 ENCOUNTER — RX RENEWAL (OUTPATIENT)
Age: 80
End: 2025-06-27

## 2025-07-03 ENCOUNTER — APPOINTMENT (OUTPATIENT)
Dept: PHYSICAL MEDICINE AND REHAB | Facility: CLINIC | Age: 80
End: 2025-07-03

## 2025-07-07 ENCOUNTER — APPOINTMENT (OUTPATIENT)
Dept: PHYSICAL MEDICINE AND REHAB | Facility: CLINIC | Age: 80
End: 2025-07-07

## 2025-07-07 VITALS
DIASTOLIC BLOOD PRESSURE: 72 MMHG | SYSTOLIC BLOOD PRESSURE: 116 MMHG | OXYGEN SATURATION: 96 % | WEIGHT: 219 LBS | HEIGHT: 73 IN | HEART RATE: 68 BPM | BODY MASS INDEX: 29.03 KG/M2 | TEMPERATURE: 97 F | RESPIRATION RATE: 16 BRPM

## 2025-08-14 ENCOUNTER — MED ADMIN CHARGE (OUTPATIENT)
Age: 80
End: 2025-08-14

## 2025-08-14 PROBLEM — S81.812A: Status: ACTIVE | Noted: 2025-08-14

## (undated) DEVICE — DRAPE MAYO STAND 30"

## (undated) DEVICE — DRAPE IOBAN 23" X 23"

## (undated) DEVICE — TORQUE DEVICE FOR GUIDEWIRE 0.0100.038"

## (undated) DEVICE — GLV 7.5 PROTEXIS (WHITE)

## (undated) DEVICE — DRAPE 3/4 SHEET W REINFORCEMENT 56X77"

## (undated) DEVICE — GLV 7 PROTEXIS (WHITE)

## (undated) DEVICE — DRAPE FEMORAL ANGIOGRAPHY W TROUGH

## (undated) DEVICE — VISITEC 4X4

## (undated) DEVICE — DRAPE LIGHT HANDLE COVER (BLUE)

## (undated) DEVICE — NDL PERC BASEPLT 18GX7CM

## (undated) DEVICE — BLADE SCALPEL SAFETYLOCK #10

## (undated) DEVICE — SUT PROLENE 7-0 24" BV175-6

## (undated) DEVICE — GLV 8 PROTEXIS (WHITE)

## (undated) DEVICE — MEDICATION LABELS W MARKER

## (undated) DEVICE — DRAPE CAMERA VIDEO 7"X96"

## (undated) DEVICE — ELCTR BOVIE PENCIL HANDPIECE

## (undated) DEVICE — SYR LUER LOK 20CC

## (undated) DEVICE — BLADE SCALPEL SAFETYLOCK #11

## (undated) DEVICE — SUCTION YANKAUER NO CONTROL VENT

## (undated) DEVICE — LAP PAD 18 X 18"

## (undated) DEVICE — DRAPE EQUIPMENT BANDED BAG 30 X 30" (SHOWER CAP)

## (undated) DEVICE — DRAPE 1/2 SHEET 40X57"

## (undated) DEVICE — PACK GENERAL MINOR

## (undated) DEVICE — TAPE GLO-N-TELL RADIOPAQUE 20 STRIPS

## (undated) DEVICE — GOWN TRIMAX LG

## (undated) DEVICE — SPECIMEN CONTAINER 100ML

## (undated) DEVICE — POSITIONER FOAM EGG CRATE ULNAR 2PCS (PINK)

## (undated) DEVICE — DRAPE TOWEL BLUE 17" X 24"

## (undated) DEVICE — DRSG TEGADERM 6"X8"

## (undated) DEVICE — Device

## (undated) DEVICE — WARMING BLANKET UPPER ADULT

## (undated) DEVICE — BLADE SCALPEL SAFETYLOCK #15

## (undated) DEVICE — NDL ENTRY PERC MCKNIGHT 18G

## (undated) DEVICE — PACK BASIN SPECIAL PROCEDURE

## (undated) DEVICE — VENODYNE/SCD SLEEVE CALF LARGE

## (undated) DEVICE — DRSG STERISTRIPS 0.5 X 4"

## (undated) DEVICE — STAPLER SKIN VISI-STAT 35 WIDE

## (undated) DEVICE — SOL IRR POUR NS 0.9% 500ML

## (undated) DEVICE — TUBING HIGH POWER CONTRAST INJ

## (undated) DEVICE — SUT BIOSYN 4-0 18" P-12

## (undated) DEVICE — DRAPE INSTRUMENT POUCH 6.75" X 11"

## (undated) DEVICE — MARKING PEN W RULER

## (undated) DEVICE — PREP CHLORAPREP HI-LITE ORANGE 26ML

## (undated) DEVICE — DRSG OPSITE 13.75 X 4"

## (undated) DEVICE — INFLATION DEVICE BASIXCOMPAK

## (undated) DEVICE — SOL IRR POUR H2O 250ML

## (undated) DEVICE — FOLEY TRAY 16FR 5CC LTX UMETER CLOSED

## (undated) DEVICE — NDL COUNTER FOAM AND MAGNET 40-70

## (undated) DEVICE — CONN DUAL HOSE